# Patient Record
Sex: FEMALE | Race: WHITE | HISPANIC OR LATINO | URBAN - METROPOLITAN AREA
[De-identification: names, ages, dates, MRNs, and addresses within clinical notes are randomized per-mention and may not be internally consistent; named-entity substitution may affect disease eponyms.]

---
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
## 2018-04-14 ENCOUNTER — EMERGENCY (EMERGENCY)
Facility: HOSPITAL | Age: 18
LOS: 0 days | Discharge: HOME | End: 2018-04-15
Attending: EMERGENCY MEDICINE

## 2018-04-14 VITALS
RESPIRATION RATE: 18 BRPM | SYSTOLIC BLOOD PRESSURE: 113 MMHG | DIASTOLIC BLOOD PRESSURE: 65 MMHG | HEART RATE: 91 BPM | OXYGEN SATURATION: 97 % | TEMPERATURE: 101 F

## 2018-04-14 DIAGNOSIS — R50.9 FEVER, UNSPECIFIED: ICD-10-CM

## 2018-04-14 DIAGNOSIS — B34.9 VIRAL INFECTION, UNSPECIFIED: ICD-10-CM

## 2018-04-14 NOTE — ED PEDIATRIC TRIAGE NOTE - CHIEF COMPLAINT QUOTE
For the last three days I feel week, my bones hurt, headache, I feel hot inside, dry cough - patinet

## 2018-04-15 VITALS
HEART RATE: 81 BPM | OXYGEN SATURATION: 99 % | SYSTOLIC BLOOD PRESSURE: 116 MMHG | DIASTOLIC BLOOD PRESSURE: 67 MMHG | TEMPERATURE: 100 F | RESPIRATION RATE: 16 BRPM

## 2018-04-15 RX ORDER — IBUPROFEN 200 MG
600 TABLET ORAL ONCE
Qty: 0 | Refills: 0 | Status: COMPLETED | OUTPATIENT
Start: 2018-04-15 | End: 2018-04-15

## 2018-04-15 RX ADMIN — Medication 600 MILLIGRAM(S): at 00:45

## 2018-04-15 NOTE — ED PEDIATRIC NURSE NOTE - OBJECTIVE STATEMENT
Pt w/ multiple complaints. C/O non-productive cough, nasal congestion, sore throat, body aches. (+) vomiting x 1 episode and nosebleed yesterday. Pt took Extra Strength Tylenol at 5 pm. No other s/sx reported, no other home remedy taken. Hx anemia.

## 2018-04-15 NOTE — ED PROVIDER NOTE - PHYSICAL EXAMINATION
Physical Examination:   VITAL SIGNS: I have reviewed vital signs.  CONSTITUTIONAL: well appearing, NAD.   SKIN: Skin exam is warm and dry.  No rashes  HEAD: Normocephalic; atraumatic.  EYES: PERRL, EOM intact; conjunctiva and sclera clear.  ENT: mild nasal discharge; airway clear. posterior pharynx normal.  TMs clear b/l.   NECK: no nuchal rigidity  CARD: S1, S2 reg  RESP: CTAB. No wheezes, rales or rhonchi.  ABD: soft; non-distended; non-tender  EXT: Normal ROM. No edema.  NEURO: Alert, oriented. Grossly unremarkable. No focal deficits. Ambulatory with steady gait.  PSYCH: Cooperative, appropriate.

## 2018-04-15 NOTE — ED PROVIDER NOTE - NS ED ROS FT
Review of Systems:  · CONSTITUTIONAL: + fever and chills.  · EYES: no discharge, no irritation, no pain, no redness, and no visual changes.  · ENMT: Ears: no ear pain and no hearing problems. Nose: + nasal congestion. Mouth/Throat: no dysphagia, no hoarseness and + throat pain.  · CARDIOVASCULAR: no chest pain  · RESPIRATORY: + cough, and no shortness of breath.  · GASTROINTESTINAL: no abdominal pain, no diarrhea, no nausea and no vomiting.  · GENITOURINARY: no dysuria  · MUSCULOSKELETAL: no back pain, + musculoskeletal pain, no weakness.  · SKIN: no rashes.  · NEURO: no loss of consciousness, + headache.  · ROS STATEMENT: all other ROS negative except as per HPI

## 2018-04-15 NOTE — ED PROVIDER NOTE - OBJECTIVE STATEMENT
16 y/o F here with fever, myalgias, headache and nasaal congestion.  1 bout of emesis yestereday.  Able to tolerate PO today.  No dysuria.  Mild cough. no diarrhea.  Tried tylenol but not motrin.  No PMH.

## 2018-07-24 PROBLEM — Z78.9 ALCOHOL USE: Status: ACTIVE | Noted: 2017-04-25

## 2021-03-17 ENCOUNTER — OUTPATIENT (OUTPATIENT)
Dept: OUTPATIENT SERVICES | Facility: HOSPITAL | Age: 21
LOS: 1 days | Discharge: HOME | End: 2021-03-17

## 2021-03-17 DIAGNOSIS — K02.63 DENTAL CARIES ON SMOOTH SURFACE PENETRATING INTO PULP: ICD-10-CM

## 2021-03-17 PROBLEM — D64.9 ANEMIA, UNSPECIFIED: Chronic | Status: ACTIVE | Noted: 2018-04-15

## 2021-04-20 ENCOUNTER — EMERGENCY (EMERGENCY)
Facility: HOSPITAL | Age: 21
LOS: 0 days | Discharge: HOME | End: 2021-04-20
Attending: STUDENT IN AN ORGANIZED HEALTH CARE EDUCATION/TRAINING PROGRAM | Admitting: STUDENT IN AN ORGANIZED HEALTH CARE EDUCATION/TRAINING PROGRAM
Payer: COMMERCIAL

## 2021-04-20 VITALS
TEMPERATURE: 98 F | OXYGEN SATURATION: 97 % | SYSTOLIC BLOOD PRESSURE: 100 MMHG | DIASTOLIC BLOOD PRESSURE: 61 MMHG | HEART RATE: 71 BPM | RESPIRATION RATE: 18 BRPM

## 2021-04-20 DIAGNOSIS — Z86.2 PERSONAL HISTORY OF DISEASES OF THE BLOOD AND BLOOD-FORMING ORGANS AND CERTAIN DISORDERS INVOLVING THE IMMUNE MECHANISM: ICD-10-CM

## 2021-04-20 DIAGNOSIS — S50.312A ABRASION OF LEFT ELBOW, INITIAL ENCOUNTER: ICD-10-CM
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
## 2021-04-20 DIAGNOSIS — M54.2 CERVICALGIA: ICD-10-CM
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
## 2021-04-20 DIAGNOSIS — M25.522 PAIN IN LEFT ELBOW: ICD-10-CM

## 2021-04-20 PROCEDURE — 73080 X-RAY EXAM OF ELBOW: CPT | Mod: 26,LT

## 2021-04-20 PROCEDURE — 71045 X-RAY EXAM CHEST 1 VIEW: CPT | Mod: 26

## 2021-04-20 PROCEDURE — 99284 EMERGENCY DEPT VISIT MOD MDM: CPT

## 2021-04-20 RX ORDER — IBUPROFEN 200 MG
400 TABLET ORAL ONCE
Refills: 0 | Status: COMPLETED | OUTPATIENT
Start: 2021-04-20 | End: 2021-04-20

## 2021-04-20 RX ORDER — METHOCARBAMOL 500 MG/1
1500 TABLET, FILM COATED ORAL ONCE
Refills: 0 | Status: COMPLETED | OUTPATIENT
Start: 2021-04-20 | End: 2021-04-20

## 2021-04-20 RX ADMIN — METHOCARBAMOL 1500 MILLIGRAM(S): 500 TABLET, FILM COATED ORAL at 10:29

## 2021-04-20 RX ADMIN — Medication 400 MILLIGRAM(S): at 10:28

## 2021-04-20 NOTE — ED PROVIDER NOTE - CLINICAL SUMMARY MEDICAL DECISION MAKING FREE TEXT BOX
no fx on imaging, pt well appearing. will dc and rec outpt f/u, nsaids, return precautions. no loc/n/v/ac or anti plt/change in mental status. no indication for CTH.

## 2021-04-20 NOTE — ED PROVIDER NOTE - PHYSICAL EXAMINATION
CONSTITUTIONAL: Well-developed; well-nourished; in no acute distress.   SKIN: warm, dry  HEAD: Normocephalic; atraumatic.  EYES: PERRL, EOMI, normal sclera and conjunctiva   ENT: No nasal discharge; airway clear.  NECK: Supple; non tender.  CARD: S1, S2 normal; no murmurs, gallops, or rubs. Regular rate and rhythm.   RESP: No wheezes, rales or rhonchi.  ABD: soft ntnd  MSK: TTP paraspinal neck, no midline tenderness  EXT: Normal ROM.  No clubbing, cyanosis or edema.   LYMPH: No acute cervical adenopathy.  NEURO: Alert, oriented, grossly unremarkable  PSYCH: Cooperative, appropriate.
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
## 2021-04-20 NOTE — ED PROVIDER NOTE - NSFOLLOWUPCLINICS_GEN_ALL_ED_FT
[facility]  Medicine  [address], [place]   Phone: [phone]  Fax:   Follow Up Time: 1-3 Days

## 2021-04-20 NOTE — ED PROVIDER NOTE - PATIENT PORTAL LINK FT
You can access the FollowMyHealth Patient Portal offered by [facility] by registering at the following website: [email or web address]. By joining [employer]’s FollowMyHealth portal, you will also be able to view your health information using other applications (apps) compatible with our system.

## 2021-04-20 NOTE — ED PROVIDER NOTE - NS ED ROS FT
Constitutional:  see HPI  Head:  no headache, dizziness, loss of consciousness  Eyes:  no visual changes; no eye pain, redness, or discharge  ENMT:  no ear pain or discharge; no hearing problems; no mouth or throat sores or lesions; no throat pain  Cardiac: no chest pain, tachycardia or palpitations  Respiratory: no cough, wheezing, shortness of breath, chest tightness, or trouble breathing  GI: no nausea, vomiting, diarrhea or abdominal pain  :  no dysuria, frequency, or burning with urination; no change in urine output  MS: paraspinal neck pain,   Neuro: no weakness; no numbness or tingling; no seizure  Skin:  no rashes or color changes; no lacerations or abrasions
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
## 2021-05-26 ENCOUNTER — EMERGENCY (EMERGENCY)
Facility: HOSPITAL | Age: 21
LOS: 0 days | Discharge: HOME | End: 2021-05-26
Attending: STUDENT IN AN ORGANIZED HEALTH CARE EDUCATION/TRAINING PROGRAM | Admitting: STUDENT IN AN ORGANIZED HEALTH CARE EDUCATION/TRAINING PROGRAM
Payer: MEDICAID

## 2021-05-26 VITALS
WEIGHT: 154.76 LBS | OXYGEN SATURATION: 99 % | SYSTOLIC BLOOD PRESSURE: 105 MMHG | HEART RATE: 71 BPM | TEMPERATURE: 98 F | RESPIRATION RATE: 18 BRPM | DIASTOLIC BLOOD PRESSURE: 56 MMHG

## 2021-05-26 VITALS
SYSTOLIC BLOOD PRESSURE: 121 MMHG | RESPIRATION RATE: 18 BRPM | TEMPERATURE: 98 F | HEART RATE: 77 BPM | DIASTOLIC BLOOD PRESSURE: 62 MMHG | OXYGEN SATURATION: 99 %

## 2021-05-26 DIAGNOSIS — O20.9 HEMORRHAGE IN EARLY PREGNANCY, UNSPECIFIED: ICD-10-CM

## 2021-05-26 DIAGNOSIS — N93.9 ABNORMAL UTERINE AND VAGINAL BLEEDING, UNSPECIFIED: ICD-10-CM

## 2021-05-26 DIAGNOSIS — Z3A.01 LESS THAN 8 WEEKS GESTATION OF PREGNANCY: ICD-10-CM

## 2021-05-26 DIAGNOSIS — O20.0 THREATENED ABORTION: ICD-10-CM

## 2021-05-26 LAB
ABO RH CONFIRMATION: SIGNIFICANT CHANGE UP
ALBUMIN SERPL ELPH-MCNC: 4.5 G/DL — SIGNIFICANT CHANGE UP (ref 3.5–5.2)
ALP SERPL-CCNC: 82 U/L — SIGNIFICANT CHANGE UP (ref 30–115)
ALT FLD-CCNC: 11 U/L — SIGNIFICANT CHANGE UP (ref 0–41)
ANION GAP SERPL CALC-SCNC: 9 MMOL/L — SIGNIFICANT CHANGE UP (ref 7–14)
APPEARANCE UR: ABNORMAL
AST SERPL-CCNC: 15 U/L — SIGNIFICANT CHANGE UP (ref 0–41)
BACTERIA # UR AUTO: NEGATIVE — SIGNIFICANT CHANGE UP
BASOPHILS # BLD AUTO: 0.02 K/UL — SIGNIFICANT CHANGE UP (ref 0–0.2)
BASOPHILS NFR BLD AUTO: 0.3 % — SIGNIFICANT CHANGE UP (ref 0–1)
BILIRUB SERPL-MCNC: <0.2 MG/DL — SIGNIFICANT CHANGE UP (ref 0.2–1.2)
BILIRUB UR-MCNC: NEGATIVE — SIGNIFICANT CHANGE UP
BLD GP AB SCN SERPL QL: SIGNIFICANT CHANGE UP
BUN SERPL-MCNC: 10 MG/DL — SIGNIFICANT CHANGE UP (ref 10–20)
CALCIUM SERPL-MCNC: 8.8 MG/DL — SIGNIFICANT CHANGE UP (ref 8.5–10.1)
CHLORIDE SERPL-SCNC: 104 MMOL/L — SIGNIFICANT CHANGE UP (ref 98–110)
CO2 SERPL-SCNC: 24 MMOL/L — SIGNIFICANT CHANGE UP (ref 17–32)
COLOR SPEC: ABNORMAL
CREAT SERPL-MCNC: 0.6 MG/DL — LOW (ref 0.7–1.5)
DIFF PNL FLD: ABNORMAL
EOSINOPHIL # BLD AUTO: 0.06 K/UL — SIGNIFICANT CHANGE UP (ref 0–0.7)
EOSINOPHIL NFR BLD AUTO: 0.9 % — SIGNIFICANT CHANGE UP (ref 0–8)
EPI CELLS # UR: 4 /HPF — SIGNIFICANT CHANGE UP (ref 0–5)
GLUCOSE SERPL-MCNC: 101 MG/DL — HIGH (ref 70–99)
GLUCOSE UR QL: NEGATIVE — SIGNIFICANT CHANGE UP
HCG SERPL-ACNC: 898.9 MIU/ML — HIGH
HCT VFR BLD CALC: 36.5 % — LOW (ref 37–47)
HGB BLD-MCNC: 12 G/DL — SIGNIFICANT CHANGE UP (ref 12–16)
HYALINE CASTS # UR AUTO: 29 /LPF — HIGH (ref 0–7)
IMM GRANULOCYTES NFR BLD AUTO: 0.4 % — HIGH (ref 0.1–0.3)
KETONES UR-MCNC: SIGNIFICANT CHANGE UP
LEUKOCYTE ESTERASE UR-ACNC: NEGATIVE — SIGNIFICANT CHANGE UP
LYMPHOCYTES # BLD AUTO: 2.14 K/UL — SIGNIFICANT CHANGE UP (ref 1.2–3.4)
LYMPHOCYTES # BLD AUTO: 30.5 % — SIGNIFICANT CHANGE UP (ref 20.5–51.1)
MCHC RBC-ENTMCNC: 27.6 PG — SIGNIFICANT CHANGE UP (ref 27–31)
MCHC RBC-ENTMCNC: 32.9 G/DL — SIGNIFICANT CHANGE UP (ref 32–37)
MCV RBC AUTO: 84.1 FL — SIGNIFICANT CHANGE UP (ref 81–99)
MONOCYTES # BLD AUTO: 0.46 K/UL — SIGNIFICANT CHANGE UP (ref 0.1–0.6)
MONOCYTES NFR BLD AUTO: 6.6 % — SIGNIFICANT CHANGE UP (ref 1.7–9.3)
NEUTROPHILS # BLD AUTO: 4.3 K/UL — SIGNIFICANT CHANGE UP (ref 1.4–6.5)
NEUTROPHILS NFR BLD AUTO: 61.3 % — SIGNIFICANT CHANGE UP (ref 42.2–75.2)
NITRITE UR-MCNC: NEGATIVE — SIGNIFICANT CHANGE UP
NRBC # BLD: 0 /100 WBCS — SIGNIFICANT CHANGE UP (ref 0–0)
PH UR: 6 — SIGNIFICANT CHANGE UP (ref 5–8)
PLATELET # BLD AUTO: 393 K/UL — SIGNIFICANT CHANGE UP (ref 130–400)
POTASSIUM SERPL-MCNC: 4.1 MMOL/L — SIGNIFICANT CHANGE UP (ref 3.5–5)
POTASSIUM SERPL-SCNC: 4.1 MMOL/L — SIGNIFICANT CHANGE UP (ref 3.5–5)
PROT SERPL-MCNC: 7 G/DL — SIGNIFICANT CHANGE UP (ref 6–8)
PROT UR-MCNC: ABNORMAL
RBC # BLD: 4.34 M/UL — SIGNIFICANT CHANGE UP (ref 4.2–5.4)
RBC # FLD: 13.7 % — SIGNIFICANT CHANGE UP (ref 11.5–14.5)
RBC CASTS # UR COMP ASSIST: 7 /HPF — HIGH (ref 0–4)
SODIUM SERPL-SCNC: 137 MMOL/L — SIGNIFICANT CHANGE UP (ref 135–146)
SP GR SPEC: 1.04 — HIGH (ref 1.01–1.03)
UROBILINOGEN FLD QL: SIGNIFICANT CHANGE UP
WBC # BLD: 7.01 K/UL — SIGNIFICANT CHANGE UP (ref 4.8–10.8)
WBC # FLD AUTO: 7.01 K/UL — SIGNIFICANT CHANGE UP (ref 4.8–10.8)
WBC UR QL: 12 /HPF — HIGH (ref 0–5)

## 2021-05-26 PROCEDURE — 99285 EMERGENCY DEPT VISIT HI MDM: CPT

## 2021-05-26 PROCEDURE — 76817 TRANSVAGINAL US OBSTETRIC: CPT | Mod: 26

## 2021-05-26 PROCEDURE — 76830 TRANSVAGINAL US NON-OB: CPT | Mod: 26

## 2021-05-26 PROCEDURE — 76815 OB US LIMITED FETUS(S): CPT | Mod: 26

## 2021-05-26 RX ORDER — ACETAMINOPHEN 500 MG
650 TABLET ORAL ONCE
Refills: 0 | Status: COMPLETED | OUTPATIENT
Start: 2021-05-26 | End: 2021-05-26

## 2021-05-26 RX ADMIN — Medication 650 MILLIGRAM(S): at 16:03

## 2021-05-26 NOTE — ED ADULT NURSE NOTE - NSIMPLEMENTINTERV_GEN_ALL_ED
Implemented All Universal Safety Interventions:  [employer] to call system. Call bell, personal items and telephone within reach. Instruct patient to call for assistance. Room bathroom lighting operational. Non-slip footwear when patient is off stretcher. Physically safe environment: no spills, clutter or unnecessary equipment. Stretcher in lowest position, wheels locked, appropriate side rails in place.

## 2021-05-26 NOTE — ED ADULT NURSE NOTE - PAIN: PRESENCE, MLM
Problem: Adult Inpatient Plan of Care  Goal: Plan of Care Review  Outcome: Ongoing (interventions implemented as appropriate)  Plan of care reviewed with the patient at the beginning of the shift. She is day +3 of an auto transplant. She has complaints of intermittent nausea that is well controlled with PO Zofran. PO intake encouraged as tolerated. IVF infusing. She denies diarrhea. Last BM yesterday. Mucous membranes are intact. Saliva substitutes and salt and soda rinses provided. Blood glucose monitored ac and hs, did not require coverage overnight. Pt has left knee swelling and discomfort from a previous fall at home. Fall precautions maintained. Bed alarm on, pt does not use call light. Alarm has gone off three times. Reinforced several times to use the call light. Bedside commode in use. Fall precautions reviewed with her several times. Pt remained free from falls and injury this shift. Bed locked in lowest position, side rails up x2, call light within reach. Instructed pt to call for assistance as needed. Pt verbalized understanding. Vitals stable. Pt afebrile overnight. Neutropenic precautions maintained. No acute issues overnight. Will continue to monitor.        denies pain/discomfort

## 2021-05-26 NOTE — ED PROVIDER NOTE - NSFOLLOWUPCLINICS_GEN_ALL_ED_FT
[facility]  OB/GYN  [address] [place] [address]  Phone: [phone]  Fax:   Follow Up Time: Urgent

## 2021-05-26 NOTE — ED PROVIDER NOTE - OBJECTIVE STATEMENT
20 year old female, , currently ~5 weeks pregnant, who presents with vaginal bleeding.  patient reports LMP 4/15, +upreg x1 week ago, patient reports vaginal spotting that began this morning progrsesively worsening throughout the day prompting presentation to ed. patient reports suprapubic discomfort. denies f/c, nausea/vomiting/diarrhea, urinary symptoms.

## 2021-05-26 NOTE — ED PROVIDER NOTE - PATIENT PORTAL LINK FT
You can access the FollowMyHealth Patient Portal offered by [facility] by registering at the following website: [email or web address]. By joining [employer]’s FollowMyHealth portal, you will also be able to view your health information using other applications (apps) compatible with our system.

## 2021-05-26 NOTE — ED PROVIDER NOTE - ATTENDING CONTRIBUTION TO CARE
20F  at 5w5d by LMP (4/15) who p/w painless vaginal bleeding that started this morning. At first only small brb but became larger amount; No clots. Denies abd pain, n/v/d/f/c, urinary sx, cp sob. Her obgyn is at [facility] but she needs an obgyn close by.     Gen - NAD, Head - NCAT, Pharynx - clear, MMM, Heart - RRR, no m/g/r, Lungs - CTAB, no w/c/r, Abdomen - soft, NT, ND, Skin - No rash, Extremities - FROM, no edema, erythema, ecchymosis, brisk cap refill, Neuro - A&O x3, equal strength and sensation, non-focal exam    a/p: will eval for threatened , IUP, ro ectopic, and obtain labs, t&s, tvus, and will reassess. 20F  at 5w5d by LMP (4/15) who p/w painless vaginal bleeding that started this morning. At first only small brb but became larger amount later today; No clots. Denies abd pain, n/v/d/f/c, urinary sx, cp sob. Her obgyn is at [facility] but she needs an obgyn close by.     Gen - NAD, Head - NCAT, Pharynx - clear, MMM, Heart - RRR, no m/g/r, Lungs - CTAB, no w/c/r, Abdomen - soft, NT, ND, Skin - No rash, Extremities - FROM, no edema, erythema, ecchymosis, brisk cap refill, Neuro - A&O x3, equal strength and sensation, non-focal exam    a/p: will eval for threatened , IUP, ro ectopic, and obtain labs, t&s, tvus, and will reassess.

## 2021-05-26 NOTE — ED ADULT NURSE NOTE - CAS TRG GEN SKIN CONDITION
Central Venous Line w/wo PAC  Performed by: Abdi Torre MD  Authorized by: Abdi Torre MD     Patient Location*:  OR  Indication: central venous access and CVP monitoring    Prep*:  Chlorhexidine gluconate w/ alcohol  Max Sterile Barrier Technique*:  Hand Washing, Cap/Mask, Sterile gown, Sterile gloves, Sterile full body drape, Biopatch, Sterile gel & probe cover and Sterile dressing applied  Patient Position:  Trendelenburg  Laterality:  Left  Site:  Internal jugular  Catheter Type:  Introducer  Number of Lumens*:  Double lumen  Catheter Size:  Other (12F)  Oximetric Catheter?: No    target vein identified, needle advanced into vein and blood aspirated and guidewire advanced into vein    Ultrasound-Guided: Yes    Sterile Gel and Probe Cover Used for Ultrasound?: Yes    Seldinger Technique: Yes    Intravenous Verification: verified by ultrasound and venous blood return    .: all ports aspirated, all ports flushed easily, guidewire was removed intact, biopatch was applied, line was sutured in place and dressing was applied    Events: patient tolerated procedure well with no complications    Attempts:  2  PA Catheter Placed?: Yes    PA Catheter Type:  Oximetric  PA Catheter Size:  7.5  Laterality:  Left  Site:  Internal jugular  Placement Confirmation: pressure tracing changes and verified by [facility]    Events:  Patient tolerated procedure well with no complications  Performed By:  Anesthesiologist  Anesthesiologist:  Abdi Torre MD   Right internal jugular attempted initially. Small caliber vein successfully accessed by needle but unable to pass wire. Moved to left internal jugular with successful placement.         Warm

## 2021-05-26 NOTE — ED PROVIDER NOTE - PHYSICAL EXAMINATION
CONSTITUTIONAL: Well-developed; well-nourished; in no acute distress, nontoxic appearing  SKIN: skin exam is warm and dry,  HEAD: Normocephalic; atraumatic.  CARD: S1, S2 normal, no murmur  RESP: No wheezes, rales or rhonchi. Good air movement bilaterally  ABD: soft; non-distended; non-tender. No Rebound, No guarding  : Speculum exam: +bleeding with small clots, no pooling. Bimanual exam: No CMT. No adnexal TTP. Chaperone: PRABHJOT Shearer  EXT: Normal ROM.   NEURO: awake, alert, following commands, oriented, grossly unremarkable. No Focal deficits. GCS 15.   PSYCH: Cooperative, appropriate.

## 2021-05-26 NOTE — ED PROVIDER NOTE - NSFOLLOWUPINSTRUCTIONS_ED_ALL_ED_FT
Please follow up with OB/GYN clinic in 1-3 days   Please be aware of any new or worsening signs or symptoms that should prompt your return to the ER.    Vaginal Bleeding in Pregnancy: Threatened     A threatened miscarriage occurs when you have vaginal bleeding during your first 20 weeks of pregnancy but the pregnancy has not ended. If you have vaginal bleeding during this time, your health care provider will do tests to make sure you are still pregnant. If the tests show you are still pregnant and the developing baby (fetus) inside your womb (uterus) is still growing, your condition is considered a threatened miscarriage. A threatened miscarriage does not mean your pregnancy will end, but it does increase the risk of losing your pregnancy.    SEEK IMMEDIATE MEDICAL CARE IF YOU HAVE THE FOLLOWING SYMPTOMS: heavy vaginal bleeding, severe low back or abdominal cramps, fever/chills, or lightheadedness/dizziness.      Incomplete Miscarriage      A miscarriage is the loss of an unborn baby (fetus) before the 20th week of pregnancy. In an incomplete miscarriage, parts of the fetus or placenta (afterbirth) remain in the body. Most miscarriages happen in the first 3 months of pregnancy. Sometimes, it happens before a woman even knows she is pregnant.    Having a miscarriage can be an emotional experience. If you have had a miscarriage, talk with your health care provider about any questions you may have about miscarrying, the grieving process, and your future pregnancy plans.      What are the causes?  This condition may be caused by:  •Problems with the genes or chromosomes that make it impossible for the baby to develop normally. These problems are most often the result of random errors that occur early in development, and are not passed from parent to child (not inherited).      •Infection of the cervix or uterus.      •Conditions that affect hormone balance in the body.      •Problems with the cervix, such as the cervix opening and thinning before pregnancy is at term (cervical insufficiency).      •Problems with the uterus, such as a uterus with an abnormal shape, fibroids in the uterus, or problems that were present from birth (congenital abnormalities).      •Certain medical conditions.      •Smoking, drinking alcohol, or using drugs.      •Injury (trauma).      Many times, the cause of a miscarriage is not known.      What are the signs or symptoms?  Symptoms of this condition include:  •Vaginal bleeding or spotting, with or without cramps or pain.      •Pain or cramping in the abdomen or lower back.      •Passing fluid, tissue, or blood clots from the vagina.        How is this diagnosed?  This condition may be diagnosed based on:  •A physical exam.      •Ultrasound.      •Blood tests.      •Urine tests.        How is this treated?  An incomplete miscarriage may be treated with:  •Dilation and curettage (D&C). This is a procedure in which the cervix is stretched open and the lining of the uterus (endometrium) is scraped to remove any remaining tissue from the pregnancy.    •Medicines, such as:  •Antibiotic medicine to treat infection.      •Medicine to help any remaining tissue pass out of your uterus.      •Medicine to reduce (contract) the size of the uterus. These medicines may be given if you have a lot of bleeding.        If you have Rh negative blood and your baby was Rh positive, you will need a shot of medicine called Rh immunoglobulinto protect future babies from Rh blood problems. "Rh-negative" and "Rh-positive" refer to whether or not the blood has a specific protein found on the surface of red blood cells (Rh factor).      Follow these instructions at home:      Medicines      •Take over-the-counter and prescription medicines only as told by your health care provider.      •If you were prescribed antibiotic medicine, take your antibiotic as told by your health care provider. Do not stop taking the antibiotic even if you start to feel better.      • Do not take NSAIDs, such as aspirin and ibuprofen, unless approved by your doctor. These medicines can cause bleeding.      Activity     •Rest as directed. Ask your health care provider what activities are safe for you.      •Have someone help with home and family responsibilities during this time.      General instructions     •Keep track of the number of sanitary pads you use each day and how soaked (saturated) they are. Write down this information.      •Monitor the amount of tissue or blood clots that you pass from your vagina. Save any large amounts of tissue for your health care provider to examine.      • Do not use tampons, douche, or have sex until your health care provider approves.      •To help you and your partner with the process of grieving, talk with your health care provider or seek counseling to help cope with the pregnancy loss.      •When you are ready, meet with your health care provider to discuss important steps you should take for your health, as well as steps to take in order to have a healthy pregnancy in the future.      •Keep all follow-up visits as told by your health care provider. This is important.        Where to find more information    •The American Congress of Obstetricians and Gynecologists: www.acog.org      •U.[place]. Department of Health and Human Services Office of Women’s Health: www.womenshealth.gov        Contact a health care provider if:    •You have a fever or chills.      •You have a foul smelling vaginal discharge.        Get help right away if:    •You have severe cramps or pain in your back or abdomen.      •You pass walnut-sized (or larger) blood clots or tissue from your vagina.      •You have heavy bleeding, soaking more than 1 regular sanitary pad in an hour.      •You become lightheaded or weak.      •You pass out.      •You have feelings of sadness that take over your thoughts, or you have thoughts of hurting yourself.        Summary    •In an incomplete miscarriage, parts of the fetus or placenta (afterbirth) remain in the body.      •There are multiple treatment options for an incomplete miscarriage, talk to your health care provider about the best option for you.      •Follow your health care provider's instructions for follow-up care.      •To help you and your partner with the process of grieving, talk with your health care provider or seek counseling to help cope with the pregnancy loss.      This information is not intended to replace advice given to you by your health care provider. Make sure you discuss any questions you have with your health care provider.      Document Revised: 2019 Document Reviewed: 2018    ElseGloss[employer] Patient Education ©  Elsevier Inc.

## 2021-05-26 NOTE — ED PROVIDER NOTE - NS ED ROS FT
Review of Systems:  	•	CONSTITUTIONAL: no fever, no diaphoresis, no chills  	•	SKIN: no rash  	•	RESPIRATORY: no shortness of breath, no cough  	•	CARDIAC: no chest pain, no palpitations  	•	GI: +abd pain, no nausea/vomiting/diarrhea/constipation  	•	GENITO-URINARY:+vaginal bleeding. no urinary symptoms  	•	MUSCULOSKELETAL: no joint paint, no swelling, no redness  	•	NEUROLOGIC: no weakness, no headache  	•	PSYCH: no anxiety, non suicidal, non homicidal, no hallucination, no depression

## 2021-05-26 NOTE — ED PROVIDER NOTE - CARE PLAN
Principal Discharge DX:	Vaginal bleeding affecting early pregnancy   Principal Discharge DX:	Vaginal bleeding affecting early pregnancy  Secondary Diagnosis:	Threatened

## 2021-05-26 NOTE — ED PROVIDER NOTE - PROGRESS NOTE DETAILS
COLLADO: patient likely w/ spontaneous . VSS. patient's pain controlled. patient given rhogam. given strict return precautions, verbalizes understanding of plan. patient to fu with ob/gyn this week.

## 2021-05-26 NOTE — ED ADULT NURSE NOTE - OBJECTIVE STATEMENT
Pt c/o vaginal bleeding, pt states she found out 1 week ago she was pregnant, unsure of how far along, pt has hx of 2 miscarriages. Denies abdominal pain/cramping.

## 2021-05-26 NOTE — ED ADULT TRIAGE NOTE - CHIEF COMPLAINT QUOTE
pt states she found out she was pregnant 1 week ago and is having bleeding starting since this morning. pt has hx of 2 miscarriages + 1 normal delivery.  pt states bleeding started as spotting but is heavier at this time.

## 2021-05-26 NOTE — ED PROVIDER NOTE - CLINICAL SUMMARY MEDICAL DECISION MAKING FREE TEXT BOX
20F  at 5w5d by LMP (4/15) who p/w painless vaginal bleeding that started this morning. Labs and imaging reviewed. hcg 898, US shows 0.5cm anechoic cystic structure in uterine segment no definitive IUP, 1.8cm L hemorrhagic cyst. Discussed with patient cannot r/o ectopic pregnancy vs early IUP vs threatened . Pt rh neg and given rhogam. On reassessment pt well appearing, walking around ED, She was given strict return precautions incl worsening bleeding or intractible pain and f/u with obgyn. I have fully discussed the medical management and delivery of care with the patient. I have discussed any available labs, imaging and treatment options with the patient. All Questions answered at the bedside and printed copies of all results provided and recommended to review with PCP. Patient confirms understanding and has been given detailed return precautions. Patient instructed to return to the ED should symptoms persist or worsen. Patient has demonstrated capacity and has verbalized understanding. Patient is well appearing upon discharge, ambulatory with a steady gait.

## 2021-05-27 LAB
CULTURE RESULTS: NO GROWTH — SIGNIFICANT CHANGE UP
SPECIMEN SOURCE: SIGNIFICANT CHANGE UP

## 2021-05-29 NOTE — ED ADULT NURSE NOTE - PAIN: PRESENCE, MLM
Patient Returning Call  Reason for call:  Patient  Information relayed to patient:  Patient was informed he was due for a colon cancer screening.  Patient stated he will  a stool card from the clinic.  Patient has additional questions:  No  If YES, what are your questions/concerns:  N/a  Okay to leave a detailed message?: No call back needed   complains of pain/discomfort

## 2021-09-15 ENCOUNTER — OUTPATIENT (OUTPATIENT)
Dept: OUTPATIENT SERVICES | Facility: HOSPITAL | Age: 21
LOS: 1 days | Discharge: HOME | End: 2021-09-15

## 2021-09-15 DIAGNOSIS — Z01.21 ENCOUNTER FOR DENTAL EXAMINATION AND CLEANING WITH ABNORMAL FINDINGS: ICD-10-CM

## 2023-03-22 ENCOUNTER — OUTPATIENT (OUTPATIENT)
Dept: INPATIENT UNIT | Facility: HOSPITAL | Age: 23
LOS: 1 days | Discharge: HOME | End: 2023-03-22
Payer: MEDICAID

## 2023-03-22 VITALS
DIASTOLIC BLOOD PRESSURE: 64 MMHG | HEART RATE: 63 BPM | SYSTOLIC BLOOD PRESSURE: 100 MMHG | TEMPERATURE: 98 F | RESPIRATION RATE: 18 BRPM

## 2023-03-22 VITALS — HEART RATE: 63 BPM | DIASTOLIC BLOOD PRESSURE: 64 MMHG | SYSTOLIC BLOOD PRESSURE: 100 MMHG

## 2023-03-22 DIAGNOSIS — R10.0 ACUTE ABDOMEN: ICD-10-CM

## 2023-03-22 DIAGNOSIS — O26.899 OTHER SPECIFIED PREGNANCY RELATED CONDITIONS, UNSPECIFIED TRIMESTER: ICD-10-CM

## 2023-03-22 LAB
BASOPHILS # BLD AUTO: 0.02 K/UL — SIGNIFICANT CHANGE UP (ref 0–0.2)
BASOPHILS NFR BLD AUTO: 0.2 % — SIGNIFICANT CHANGE UP (ref 0–1)
BLD GP AB SCN SERPL QL: SIGNIFICANT CHANGE UP
EOSINOPHIL # BLD AUTO: 0.03 K/UL — SIGNIFICANT CHANGE UP (ref 0–0.7)
EOSINOPHIL NFR BLD AUTO: 0.3 % — SIGNIFICANT CHANGE UP (ref 0–8)
HCT VFR BLD CALC: 30.2 % — LOW (ref 37–47)
HGB BLD-MCNC: 10.2 G/DL — LOW (ref 12–16)
HIV 1 & 2 AB SERPL IA.RAPID: SIGNIFICANT CHANGE UP
IMM GRANULOCYTES NFR BLD AUTO: 0.6 % — HIGH (ref 0.1–0.3)
LYMPHOCYTES # BLD AUTO: 1.73 K/UL — SIGNIFICANT CHANGE UP (ref 1.2–3.4)
LYMPHOCYTES # BLD AUTO: 18.7 % — LOW (ref 20.5–51.1)
MCHC RBC-ENTMCNC: 29.4 PG — SIGNIFICANT CHANGE UP (ref 27–31)
MCHC RBC-ENTMCNC: 33.8 G/DL — SIGNIFICANT CHANGE UP (ref 32–37)
MCV RBC AUTO: 87 FL — SIGNIFICANT CHANGE UP (ref 81–99)
MONOCYTES # BLD AUTO: 0.6 K/UL — SIGNIFICANT CHANGE UP (ref 0.1–0.6)
MONOCYTES NFR BLD AUTO: 6.5 % — SIGNIFICANT CHANGE UP (ref 1.7–9.3)
NEUTROPHILS # BLD AUTO: 6.81 K/UL — HIGH (ref 1.4–6.5)
NEUTROPHILS NFR BLD AUTO: 73.7 % — SIGNIFICANT CHANGE UP (ref 42.2–75.2)
NRBC # BLD: 0 /100 WBCS — SIGNIFICANT CHANGE UP (ref 0–0)
PLATELET # BLD AUTO: 359 K/UL — SIGNIFICANT CHANGE UP (ref 130–400)
PRENATAL SYPHILIS TEST: SIGNIFICANT CHANGE UP
RBC # BLD: 3.47 M/UL — LOW (ref 4.2–5.4)
RBC # FLD: 13.2 % — SIGNIFICANT CHANGE UP (ref 11.5–14.5)
WBC # BLD: 9.25 K/UL — SIGNIFICANT CHANGE UP (ref 4.8–10.8)
WBC # FLD AUTO: 9.25 K/UL — SIGNIFICANT CHANGE UP (ref 4.8–10.8)

## 2023-03-22 PROCEDURE — 86901 BLOOD TYPING SEROLOGIC RH(D): CPT

## 2023-03-22 PROCEDURE — 85025 COMPLETE CBC W/AUTO DIFF WBC: CPT

## 2023-03-22 PROCEDURE — 86765 RUBEOLA ANTIBODY: CPT

## 2023-03-22 PROCEDURE — 36415 COLL VENOUS BLD VENIPUNCTURE: CPT

## 2023-03-22 PROCEDURE — 86592 SYPHILIS TEST NON-TREP QUAL: CPT

## 2023-03-22 PROCEDURE — 87340 HEPATITIS B SURFACE AG IA: CPT

## 2023-03-22 PROCEDURE — 86762 RUBELLA ANTIBODY: CPT

## 2023-03-22 PROCEDURE — 86900 BLOOD TYPING SEROLOGIC ABO: CPT

## 2023-03-22 PROCEDURE — 86703 HIV-1/HIV-2 1 RESULT ANTBDY: CPT

## 2023-03-22 PROCEDURE — 99214 OFFICE O/P EST MOD 30 MIN: CPT

## 2023-03-22 PROCEDURE — 86735 MUMPS ANTIBODY: CPT

## 2023-03-22 PROCEDURE — 86480 TB TEST CELL IMMUN MEASURE: CPT

## 2023-03-22 PROCEDURE — 86787 VARICELLA-ZOSTER ANTIBODY: CPT

## 2023-03-22 PROCEDURE — 86850 RBC ANTIBODY SCREEN: CPT

## 2023-03-22 NOTE — OB PROVIDER TRIAGE NOTE - NS_OBGYNHISTORY_OBGYN_ALL_OB_FT
OB Hx:   P1: PT VD 24 weeks, PPROM, NND  P2:  F 7-8  2x SAB no d/c  Gyn Hx: Denies h/o abnormal pap, STI, ovarian cysts, or fibroids OB Hx:   P1: PT  24 weeks, PPROM, PPH requiring transfusion, NND  P2:  F 7-8  2x SAB no d/c  Gyn Hx: Denies h/o abnormal pap, STI, ovarian cysts, or fibroids

## 2023-03-22 NOTE — OB RN TRIAGE NOTE - FALL HARM RISK - UNIVERSAL INTERVENTIONS
Bed in lowest position, wheels locked, appropriate side rails in place/Call bell, personal items and telephone in reach/Instruct patient to call for assistance before getting out of bed or chair/Non-slip footwear when patient is out of bed/[place] to call system/Physically safe environment - no spills, clutter or unnecessary equipment/Purposeful Proactive Rounding/Room/bathroom lighting operational, light cord in reach

## 2023-03-22 NOTE — OB PROVIDER TRIAGE NOTE - NSHPPHYSICALEXAM_GEN_ALL_CORE
Vital Signs Last 24 Hrs  T(C): 36.8 (22 Mar 2023 11:30), Max: 36.8 (22 Mar 2023 11:30)  T(F): 98.2 (22 Mar 2023 11:30), Max: 98.2 (22 Mar 2023 11:30)  HR: 63 (22 Mar 2023 11:30) (63 - 63)  BP: 100/64 (22 Mar 2023 11:30) (100/64 - 100/64)  RR: 18 (22 Mar 2023 11:30) (18 - 18) Vital Signs Last 24 Hrs  T(C): 36.8 (22 Mar 2023 11:30), Max: 36.8 (22 Mar 2023 11:30)  T(F): 98.2 (22 Mar 2023 11:30), Max: 98.2 (22 Mar 2023 11:30)  HR: 63 (22 Mar 2023 11:30) (63 - 63)  BP: 100/64 (22 Mar 2023 11:30) (100/64 - 100/64)  RR: 18 (22 Mar 2023 11:30) (18 - 18)    Physical Exam  Vital Signs Last 24 Hrs  T(F): 98.2 (22 Mar 2023 11:30), Max: 98.2 (22 Mar 2023 11:30)  HR: 63 (22 Mar 2023 11:30) (63 - 63)  BP: 100/64 (22 Mar 2023 11:30) (100/64 - 100/64)  RR: 18 (22 Mar 2023 11:30) (18 - 18)    Gen: NAD, AOx3  Abdomen: soft, gravid, nontender, no palpable contractions    EFM:  [place]:none  SVE:0/0/-3    BSS: TVUS CL 2.69cm  BPP 10/10 EFW transverse head to maternal left Vital Signs Last 24 Hrs  T(C): 36.8 (22 Mar 2023 11:30), Max: 36.8 (22 Mar 2023 11:30)  T(F): 98.2 (22 Mar 2023 11:30), Max: 98.2 (22 Mar 2023 11:30)  HR: 63 (22 Mar 2023 11:30) (63 - 63)  BP: 100/64 (22 Mar 2023 11:30) (100/64 - 100/64)  RR: 18 (22 Mar 2023 11:30) (18 - 18)    Physical Exam  Vital Signs Last 24 Hrs  T(F): 98.2 (22 Mar 2023 11:30), Max: 98.2 (22 Mar 2023 11:30)  HR: 63 (22 Mar 2023 11:30) (63 - 63)  BP: 100/64 (22 Mar 2023 11:30) (100/64 - 100/64)  RR: 18 (22 Mar 2023 11:30) (18 - 18)    Gen: NAD, AOx3  Abdomen: soft, gravid, nontender, no palpable contractions    EFM: 150bpm/moderate variability/+accels  [place]: none  SVE: 0/0/-3    BSS: TVUS CL 2.69cm  BPP 10/10 EFW transverse head to maternal left

## 2023-03-22 NOTE — OB PROVIDER TRIAGE NOTE - HISTORY OF PRESENT ILLNESS
21 yo  @ 25w6d by LMP (22). Presents to triage for care as she has not seen anyone this pregnancy. She endorses mild lower abdominal pain, worse with movement, usually when putting her pants on. She denies any vb, lof, cx, severe abdominal pain, dysuria, fever, chills, constipation or diarrhea. She states that she has a history of  delivery at 24 weeks, PPROM. She also endorses PPH after her , requiring blood transfusion. She denies any complications this pregnancy. Her last delivery was at [facility] but would like to establish care with us and deliver here.   21 yo  at 25w6d GA dated by LMP (22) presents to triage to establish prenatal care. Pt reports that she has not seen any provider for this pregnancy.   She endorses mild lower abdominal pain, worse with movement, usually when putting her pants on, rated 3/10 in intensity. She denies vaginal bleeding, leakage of fluid, contractions, severe abdominal pain, dysuria, fever, chills, constipation or diarrhea.     Pregnancy complicated by:   -h/o  delivery at 24 weeks, PPROM; with PPH requiring blood transfusion  -no prenatal care prior to presentation today        Sick Contacts: Denies   Recent Travel: Denies     Last bowel movement: this morning   Last sexual intercourse: week ago

## 2023-03-22 NOTE — OB PROVIDER TRIAGE NOTE - NSOBPROVIDERNOTE_OBGYN_ALL_OB_FT
23yo  @ 25w6d, establishing care, no prenatal care this pregnancy, h/o PPH post , reassuring maternal and fetal status    -PO hydration and Ambulation  -PTL precautions discussed  -F/u at [facility] for prenatal care    Dr. Best and Dr. Jansen aware A/P: 23yo  at 25w6d GA; presenting for establishment of prenatal care, with reassuring maternal and fetal status    -PO hydration and Ambulation  -PTL precautions discussed  -prenatal labs  -F/u at [facility] for prenatal care    Dr. Best and Dr. Jansen aware

## 2023-03-22 NOTE — OB RN TRIAGE NOTE - CHIEF COMPLAINT QUOTE
"want to see if everything is ok with the baby"  patient has not had prenatal care during this pregnancy

## 2023-03-23 LAB
HBV SURFACE AG SERPL QL IA: SIGNIFICANT CHANGE UP
MEV IGG SER-ACNC: 58.2 AU/ML — SIGNIFICANT CHANGE UP
MEV IGG+IGM SER-IMP: POSITIVE — SIGNIFICANT CHANGE UP
MUV AB SER-ACNC: POSITIVE — SIGNIFICANT CHANGE UP
MUV IGG FLD-ACNC: 15.9 AU/ML — SIGNIFICANT CHANGE UP
RUBV IGG SER-ACNC: 0.6 INDEX — SIGNIFICANT CHANGE UP
RUBV IGG SER-IMP: NEGATIVE — SIGNIFICANT CHANGE UP
VZV IGG FLD QL IA: 74.8 INDEX — SIGNIFICANT CHANGE UP
VZV IGG FLD QL IA: NEGATIVE — SIGNIFICANT CHANGE UP

## 2023-03-27 ENCOUNTER — OUTPATIENT (OUTPATIENT)
Dept: OUTPATIENT SERVICES | Facility: HOSPITAL | Age: 23
LOS: 1 days | End: 2023-03-27
Payer: SELF-PAY

## 2023-03-27 ENCOUNTER — RESULT CHARGE (OUTPATIENT)
Age: 23
End: 2023-03-27

## 2023-03-27 ENCOUNTER — APPOINTMENT (OUTPATIENT)
Dept: OBGYN | Facility: CLINIC | Age: 23
End: 2023-03-27
Payer: SELF-PAY

## 2023-03-27 ENCOUNTER — APPOINTMENT (OUTPATIENT)
Dept: OBGYN | Facility: CLINIC | Age: 23
End: 2023-03-27

## 2023-03-27 VITALS
HEART RATE: 77 BPM | HEIGHT: 64 IN | WEIGHT: 163 LBS | DIASTOLIC BLOOD PRESSURE: 70 MMHG | SYSTOLIC BLOOD PRESSURE: 109 MMHG | BODY MASS INDEX: 27.83 KG/M2

## 2023-03-27 DIAGNOSIS — Z34.90 ENCOUNTER FOR SUPERVISION OF NORMAL PREGNANCY, UNSPECIFIED, UNSPECIFIED TRIMESTER: ICD-10-CM

## 2023-03-27 LAB
BILIRUB UR QL STRIP: NEGATIVE
CLARITY UR: CLEAR
COLLECTION METHOD: NORMAL
GLUCOSE UR-MCNC: NEGATIVE
HCG UR QL: 0.2 EU/DL
HGB UR QL STRIP.AUTO: NEGATIVE
KETONES UR-MCNC: NEGATIVE
LEUKOCYTE ESTERASE UR QL STRIP: NEGATIVE
NITRITE UR QL STRIP: NEGATIVE
PH UR STRIP: 6
PROT UR STRIP-MCNC: NORMAL
SP GR UR STRIP: 1.03

## 2023-03-27 PROCEDURE — 87591 N.GONORRHOEAE DNA AMP PROB: CPT

## 2023-03-27 PROCEDURE — 81002 URINALYSIS NONAUTO W/O SCOPE: CPT

## 2023-03-27 PROCEDURE — 81243 FMR1 GEN ALY DETC ABNL ALLEL: CPT

## 2023-03-27 PROCEDURE — 99204 OFFICE O/P NEW MOD 45 MIN: CPT

## 2023-03-27 PROCEDURE — 86901 BLOOD TYPING SEROLOGIC RH(D): CPT

## 2023-03-27 PROCEDURE — 88142 CYTOPATH C/V THIN LAYER: CPT

## 2023-03-27 PROCEDURE — 87661 TRICHOMONAS VAGINALIS AMPLIF: CPT

## 2023-03-27 PROCEDURE — 81204 AR GENE CHARAC ALLELES: CPT

## 2023-03-27 PROCEDURE — 87491 CHLMYD TRACH DNA AMP PROBE: CPT

## 2023-03-28 DIAGNOSIS — Z34.90 ENCOUNTER FOR SUPERVISION OF NORMAL PREGNANCY, UNSPECIFIED, UNSPECIFIED TRIMESTER: ICD-10-CM

## 2023-03-28 LAB
C TRACH RRNA SPEC QL NAA+PROBE: NOT DETECTED
GAMMA INTERFERON BACKGROUND BLD IA-ACNC: 0.01 IU/ML — SIGNIFICANT CHANGE UP
M TB IFN-G BLD-IMP: NEGATIVE — SIGNIFICANT CHANGE UP
M TB IFN-G CD4+ BCKGRND COR BLD-ACNC: 0 IU/ML — SIGNIFICANT CHANGE UP
M TB IFN-G CD4+CD8+ BCKGRND COR BLD-ACNC: 0 IU/ML — SIGNIFICANT CHANGE UP
N GONORRHOEA RRNA SPEC QL NAA+PROBE: NOT DETECTED
QUANT TB PLUS MITOGEN MINUS NIL: 4.47 IU/ML — SIGNIFICANT CHANGE UP
SOURCE AMPLIFICATION: NORMAL
SOURCE AMPLIFICATION: NORMAL
T VAGINALIS RRNA SPEC QL NAA+PROBE: NOT DETECTED

## 2023-03-30 LAB — CYTOLOGY CVX/VAG DOC THIN PREP: NORMAL

## 2023-04-11 ENCOUNTER — ASOB RESULT (OUTPATIENT)
Age: 23
End: 2023-04-11

## 2023-04-11 ENCOUNTER — APPOINTMENT (OUTPATIENT)
Dept: ANTEPARTUM | Facility: CLINIC | Age: 23
End: 2023-04-11
Payer: SELF-PAY

## 2023-04-11 ENCOUNTER — OUTPATIENT (OUTPATIENT)
Dept: OUTPATIENT SERVICES | Facility: HOSPITAL | Age: 23
LOS: 1 days | End: 2023-04-11
Payer: SELF-PAY

## 2023-04-11 DIAGNOSIS — Z34.90 ENCOUNTER FOR SUPERVISION OF NORMAL PREGNANCY, UNSPECIFIED, UNSPECIFIED TRIMESTER: ICD-10-CM

## 2023-04-11 PROCEDURE — 76805 OB US >/= 14 WKS SNGL FETUS: CPT

## 2023-04-11 PROCEDURE — 76805 OB US >/= 14 WKS SNGL FETUS: CPT | Mod: 26

## 2023-04-17 DIAGNOSIS — O09.33 SUPERVISION OF PREGNANCY WITH INSUFFICIENT ANTENATAL CARE, THIRD TRIMESTER: ICD-10-CM

## 2023-04-17 DIAGNOSIS — Z36.3 ENCOUNTER FOR ANTENATAL SCREENING FOR MALFORMATIONS: ICD-10-CM

## 2023-04-17 DIAGNOSIS — Z3A.29 29 WEEKS GESTATION OF PREGNANCY: ICD-10-CM

## 2023-04-21 ENCOUNTER — NON-APPOINTMENT (OUTPATIENT)
Age: 23
End: 2023-04-21

## 2023-04-24 ENCOUNTER — RESULT CHARGE (OUTPATIENT)
Age: 23
End: 2023-04-24

## 2023-04-24 ENCOUNTER — APPOINTMENT (OUTPATIENT)
Dept: OBGYN | Facility: CLINIC | Age: 23
End: 2023-04-24
Payer: SELF-PAY

## 2023-04-24 ENCOUNTER — OUTPATIENT (OUTPATIENT)
Dept: OUTPATIENT SERVICES | Facility: HOSPITAL | Age: 23
LOS: 1 days | End: 2023-04-24
Payer: SELF-PAY

## 2023-04-24 VITALS
DIASTOLIC BLOOD PRESSURE: 60 MMHG | BODY MASS INDEX: 28.85 KG/M2 | SYSTOLIC BLOOD PRESSURE: 100 MMHG | WEIGHT: 169 LBS | HEIGHT: 64 IN

## 2023-04-24 DIAGNOSIS — Z34.90 ENCOUNTER FOR SUPERVISION OF NORMAL PREGNANCY, UNSPECIFIED, UNSPECIFIED TRIMESTER: ICD-10-CM

## 2023-04-24 LAB
BASOPHILS # BLD AUTO: 0.02 K/UL
BASOPHILS NFR BLD AUTO: 0.2 %
BILIRUB UR QL STRIP: NORMAL
CLARITY UR: CLEAR
COLLECTION METHOD: NORMAL
EOSINOPHIL # BLD AUTO: 0.04 K/UL
EOSINOPHIL NFR BLD AUTO: 0.4 %
ESTIMATED AVERAGE GLUCOSE: 94 MG/DL
GLUCOSE UR-MCNC: NORMAL
HBA1C MFR BLD HPLC: 4.9 %
HCG UR QL: 0.2 EU/DL
HCT VFR BLD CALC: 30.5 %
HGB BLD-MCNC: 10.2 G/DL
HGB UR QL STRIP.AUTO: NORMAL
IMM GRANULOCYTES NFR BLD AUTO: 0.6 %
KETONES UR-MCNC: NORMAL
LEUKOCYTE ESTERASE UR QL STRIP: NORMAL
LYMPHOCYTES # BLD AUTO: 1.64 K/UL
LYMPHOCYTES NFR BLD AUTO: 16.4 %
MAN DIFF?: NORMAL
MCHC RBC-ENTMCNC: 29.5 PG
MCHC RBC-ENTMCNC: 33.4 G/DL
MCV RBC AUTO: 88.2 FL
MONOCYTES # BLD AUTO: 0.72 K/UL
MONOCYTES NFR BLD AUTO: 7.2 %
NEUTROPHILS # BLD AUTO: 7.51 K/UL
NEUTROPHILS NFR BLD AUTO: 75.2 %
NITRITE UR QL STRIP: NORMAL
PH UR STRIP: 6
PLATELET # BLD AUTO: 397 K/UL
PROT UR STRIP-MCNC: 30
RBC # BLD: 3.46 M/UL
RBC # FLD: 13.5 %
SP GR UR STRIP: 1.01
WBC # FLD AUTO: 9.99 K/UL

## 2023-04-24 PROCEDURE — 83020 HEMOGLOBIN ELECTROPHORESIS: CPT | Mod: 26

## 2023-04-24 PROCEDURE — G0452: CPT | Mod: 26

## 2023-04-24 PROCEDURE — 99213 OFFICE O/P EST LOW 20 MIN: CPT

## 2023-04-24 PROCEDURE — 81002 URINALYSIS NONAUTO W/O SCOPE: CPT

## 2023-04-24 RX ADMIN — HUMAN RHO(D) IMMUNE GLOBULIN 0 UNIT: 300 INJECTION, SOLUTION INTRAMUSCULAR at 00:00

## 2023-04-25 LAB
ABO + RH PNL BLD: NORMAL
BLD GP AB SCN SERPL QL: NORMAL
GLUCOSE 1H P 50 G GLC PO SERPL-MCNC: 94 MG/DL
HGB A MFR BLD: 97.4 %
HGB A2 MFR BLD: 2.6 %
HGB FRACT BLD-IMP: NORMAL
HIV1+2 AB SPEC QL IA.RAPID: NONREACTIVE
LEAD BLD-MCNC: <1 UG/DL

## 2023-04-26 DIAGNOSIS — Z34.90 ENCOUNTER FOR SUPERVISION OF NORMAL PREGNANCY, UNSPECIFIED, UNSPECIFIED TRIMESTER: ICD-10-CM

## 2023-04-26 LAB
HBV SURFACE AG SER QL: NONREACTIVE
HCV AB SER QL: NONREACTIVE
HCV S/CO RATIO: 0.22 S/CO
MEV IGG FLD QL IA: 61.6 AU/ML
MEV IGG+IGM SER-IMP: POSITIVE
RUBV IGG FLD-ACNC: 0.8 INDEX
RUBV IGG SER-IMP: NEGATIVE
T PALLIDUM AB SER QL IA: NEGATIVE
VZV AB TITR SER: NEGATIVE
VZV IGG SER IF-ACNC: 52.9 INDEX

## 2023-04-28 LAB
AR GENE MUT ANL BLD/T: NORMAL
CHROMOSOME13 INTERPRETATION: NORMAL
CHROMOSOME13 TEST RESULT: NORMAL
CHROMOSOME18 INTERPRETATION: NORMAL
CHROMOSOME18 TEST RESULT: NORMAL
CHROMOSOME21 INTERPRETATION: NORMAL
CHROMOSOME21 TEST RESULT: NORMAL
FETAL FRACTION: NORMAL
FMR1 GENE MUT ANL BLD/T: NORMAL
PERFORMANCE AND LIMITATIONS: NORMAL
SEX CHROMOSOME INTERPRETATION: NORMAL
SEX CHROMOSOME TEST RESULT: NORMAL
VERIFI PRENATAL TEST: NOT DETECTED

## 2023-04-30 LAB — CFTR MUT TESTED BLD/T: NEGATIVE

## 2023-05-08 ENCOUNTER — OUTPATIENT (OUTPATIENT)
Dept: OUTPATIENT SERVICES | Facility: HOSPITAL | Age: 23
LOS: 1 days | End: 2023-05-08
Payer: MEDICAID

## 2023-05-08 ENCOUNTER — APPOINTMENT (OUTPATIENT)
Dept: OBGYN | Facility: CLINIC | Age: 23
End: 2023-05-08
Payer: SELF-PAY

## 2023-05-08 ENCOUNTER — NON-APPOINTMENT (OUTPATIENT)
Age: 23
End: 2023-05-08

## 2023-05-08 VITALS
WEIGHT: 169.06 LBS | BODY MASS INDEX: 28.86 KG/M2 | HEIGHT: 64 IN | SYSTOLIC BLOOD PRESSURE: 100 MMHG | DIASTOLIC BLOOD PRESSURE: 70 MMHG

## 2023-05-08 DIAGNOSIS — Z34.90 ENCOUNTER FOR SUPERVISION OF NORMAL PREGNANCY, UNSPECIFIED, UNSPECIFIED TRIMESTER: ICD-10-CM

## 2023-05-08 PROCEDURE — 99213 OFFICE O/P EST LOW 20 MIN: CPT

## 2023-05-08 PROCEDURE — 81002 URINALYSIS NONAUTO W/O SCOPE: CPT

## 2023-05-09 ENCOUNTER — ASOB RESULT (OUTPATIENT)
Age: 23
End: 2023-05-09

## 2023-05-09 ENCOUNTER — APPOINTMENT (OUTPATIENT)
Dept: ANTEPARTUM | Facility: CLINIC | Age: 23
End: 2023-05-09
Payer: MEDICAID

## 2023-05-09 ENCOUNTER — OUTPATIENT (OUTPATIENT)
Dept: OUTPATIENT SERVICES | Facility: HOSPITAL | Age: 23
LOS: 1 days | End: 2023-05-09
Payer: SELF-PAY

## 2023-05-09 DIAGNOSIS — Z34.90 ENCOUNTER FOR SUPERVISION OF NORMAL PREGNANCY, UNSPECIFIED, UNSPECIFIED TRIMESTER: ICD-10-CM

## 2023-05-09 PROCEDURE — 76816 OB US FOLLOW-UP PER FETUS: CPT

## 2023-05-09 PROCEDURE — 76818 FETAL BIOPHYS PROFILE W/NST: CPT | Mod: 26

## 2023-05-10 DIAGNOSIS — Z3A.33 33 WEEKS GESTATION OF PREGNANCY: ICD-10-CM

## 2023-05-10 DIAGNOSIS — Z36.4 ENCOUNTER FOR ANTENATAL SCREENING FOR FETAL GROWTH RETARDATION: ICD-10-CM

## 2023-05-10 DIAGNOSIS — O09.33 SUPERVISION OF PREGNANCY WITH INSUFFICIENT ANTENATAL CARE, THIRD TRIMESTER: ICD-10-CM

## 2023-05-11 DIAGNOSIS — Z34.90 ENCOUNTER FOR SUPERVISION OF NORMAL PREGNANCY, UNSPECIFIED, UNSPECIFIED TRIMESTER: ICD-10-CM

## 2023-05-22 ENCOUNTER — APPOINTMENT (OUTPATIENT)
Dept: OBGYN | Facility: CLINIC | Age: 23
End: 2023-05-22
Payer: SELF-PAY

## 2023-05-22 ENCOUNTER — NON-APPOINTMENT (OUTPATIENT)
Age: 23
End: 2023-05-22

## 2023-05-22 ENCOUNTER — RESULT CHARGE (OUTPATIENT)
Age: 23
End: 2023-05-22

## 2023-05-22 ENCOUNTER — OUTPATIENT (OUTPATIENT)
Dept: OUTPATIENT SERVICES | Facility: HOSPITAL | Age: 23
LOS: 1 days | End: 2023-05-22
Payer: SELF-PAY

## 2023-05-22 VITALS
SYSTOLIC BLOOD PRESSURE: 109 MMHG | HEART RATE: 85 BPM | WEIGHT: 171 LBS | DIASTOLIC BLOOD PRESSURE: 65 MMHG | BODY MASS INDEX: 29.19 KG/M2 | HEIGHT: 64 IN

## 2023-05-22 DIAGNOSIS — Z34.90 ENCOUNTER FOR SUPERVISION OF NORMAL PREGNANCY, UNSPECIFIED, UNSPECIFIED TRIMESTER: ICD-10-CM

## 2023-05-22 LAB
BILIRUB UR QL STRIP: NEGATIVE
CLARITY UR: CLEAR
COLLECTION METHOD: NORMAL
GLUCOSE UR-MCNC: NEGATIVE
HCG UR QL: 0.2 EU/DL
HGB UR QL STRIP.AUTO: NEGATIVE
KETONES UR-MCNC: NEGATIVE
LEUKOCYTE ESTERASE UR QL STRIP: NEGATIVE
NITRITE UR QL STRIP: NEGATIVE
PH UR STRIP: 6
PROT UR STRIP-MCNC: NEGATIVE
SP GR UR STRIP: 1.03

## 2023-05-22 PROCEDURE — 99213 OFFICE O/P EST LOW 20 MIN: CPT

## 2023-05-22 PROCEDURE — 81002 URINALYSIS NONAUTO W/O SCOPE: CPT

## 2023-05-23 DIAGNOSIS — Z34.90 ENCOUNTER FOR SUPERVISION OF NORMAL PREGNANCY, UNSPECIFIED, UNSPECIFIED TRIMESTER: ICD-10-CM

## 2023-06-05 ENCOUNTER — NON-APPOINTMENT (OUTPATIENT)
Age: 23
End: 2023-06-05

## 2023-06-05 ENCOUNTER — RESULT CHARGE (OUTPATIENT)
Age: 23
End: 2023-06-05

## 2023-06-05 ENCOUNTER — APPOINTMENT (OUTPATIENT)
Dept: OBGYN | Facility: CLINIC | Age: 23
End: 2023-06-05
Payer: MEDICAID

## 2023-06-05 ENCOUNTER — OUTPATIENT (OUTPATIENT)
Dept: OUTPATIENT SERVICES | Facility: HOSPITAL | Age: 23
LOS: 1 days | End: 2023-06-05
Payer: MEDICAID

## 2023-06-05 VITALS
HEART RATE: 71 BPM | SYSTOLIC BLOOD PRESSURE: 109 MMHG | WEIGHT: 168 LBS | HEIGHT: 64 IN | DIASTOLIC BLOOD PRESSURE: 72 MMHG | BODY MASS INDEX: 28.68 KG/M2

## 2023-06-05 DIAGNOSIS — Z34.90 ENCOUNTER FOR SUPERVISION OF NORMAL PREGNANCY, UNSPECIFIED, UNSPECIFIED TRIMESTER: ICD-10-CM

## 2023-06-05 LAB
BILIRUB UR QL STRIP: NORMAL
CLARITY UR: CLEAR
COLLECTION METHOD: NORMAL
GLUCOSE UR-MCNC: NORMAL
HCG UR QL: 0.2 EU/DL
HGB UR QL STRIP.AUTO: NORMAL
KETONES UR-MCNC: NORMAL
LEUKOCYTE ESTERASE UR QL STRIP: NORMAL
NITRITE UR QL STRIP: NORMAL
PH UR STRIP: 6
PROT UR STRIP-MCNC: NORMAL
SP GR UR STRIP: 1.02

## 2023-06-05 PROCEDURE — 99213 OFFICE O/P EST LOW 20 MIN: CPT

## 2023-06-05 PROCEDURE — 87653 STREP B DNA AMP PROBE: CPT

## 2023-06-05 PROCEDURE — 87491 CHLMYD TRACH DNA AMP PROBE: CPT

## 2023-06-05 PROCEDURE — 87591 N.GONORRHOEAE DNA AMP PROB: CPT

## 2023-06-05 PROCEDURE — 81002 URINALYSIS NONAUTO W/O SCOPE: CPT

## 2023-06-06 DIAGNOSIS — Z34.90 ENCOUNTER FOR SUPERVISION OF NORMAL PREGNANCY, UNSPECIFIED, UNSPECIFIED TRIMESTER: ICD-10-CM

## 2023-06-06 LAB
C TRACH RRNA SPEC QL NAA+PROBE: NOT DETECTED
N GONORRHOEA RRNA SPEC QL NAA+PROBE: NOT DETECTED
SOURCE AMPLIFICATION: NORMAL

## 2023-06-07 ENCOUNTER — NON-APPOINTMENT (OUTPATIENT)
Age: 23
End: 2023-06-07

## 2023-06-07 LAB
GP B STREP DNA SPEC QL NAA+PROBE: NOT DETECTED
SOURCE GBS: NORMAL

## 2023-06-08 ENCOUNTER — OUTPATIENT (OUTPATIENT)
Dept: INPATIENT UNIT | Facility: HOSPITAL | Age: 23
LOS: 1 days | Discharge: HOME | End: 2023-06-08
Payer: MEDICAID

## 2023-06-08 VITALS — DIASTOLIC BLOOD PRESSURE: 62 MMHG | HEART RATE: 102 BPM | SYSTOLIC BLOOD PRESSURE: 109 MMHG

## 2023-06-08 VITALS
TEMPERATURE: 100 F | SYSTOLIC BLOOD PRESSURE: 109 MMHG | RESPIRATION RATE: 18 BRPM | DIASTOLIC BLOOD PRESSURE: 62 MMHG | HEART RATE: 102 BPM

## 2023-06-08 DIAGNOSIS — O26.899 OTHER SPECIFIED PREGNANCY RELATED CONDITIONS, UNSPECIFIED TRIMESTER: ICD-10-CM

## 2023-06-08 DIAGNOSIS — O26.893 OTHER SPECIFIED PREGNANCY RELATED CONDITIONS, THIRD TRIMESTER: ICD-10-CM

## 2023-06-08 DIAGNOSIS — O47.1 FALSE LABOR AT OR AFTER 37 COMPLETED WEEKS OF GESTATION: ICD-10-CM

## 2023-06-08 DIAGNOSIS — Z3A.00 WEEKS OF GESTATION OF PREGNANCY NOT SPECIFIED: ICD-10-CM

## 2023-06-08 PROCEDURE — 99417 PROLNG OP E/M EACH 15 MIN: CPT | Mod: 25

## 2023-06-08 PROCEDURE — 99214 OFFICE O/P EST MOD 30 MIN: CPT

## 2023-06-08 PROCEDURE — 99215 OFFICE O/P EST HI 40 MIN: CPT | Mod: 25

## 2023-06-08 PROCEDURE — 76815 OB US LIMITED FETUS(S): CPT | Mod: 26

## 2023-06-08 PROCEDURE — 59025 FETAL NON-STRESS TEST: CPT | Mod: 26

## 2023-06-08 NOTE — OB PROVIDER TRIAGE NOTE - ATTENDING COMMENTS
Patient presented to triage-I met the patient and started my evaluation at 0355. The fetal heart rate monitor ended 0508. I spent 73 minutes caring for the patient. It included obtaining a history, performing of multiple examinations, continuously monitoring the fetus and interpretation of the fetal heart rate strip, ordering and reviewing labs-serial sets , documenting in the medical record and multiple discussions with the patient    21 y/o  @ 37 wks, in early labor, reassuring maternal and fetal status. Precautions discussed. Follow up at scheduled prenatal visit or PRN.

## 2023-06-08 NOTE — OB PROVIDER TRIAGE NOTE - LIVING CHILDREN, OB PROFILE
1
GI bleed with symptomatic hypotension/anemia in the setting of Eliquis use, likely upper GI in nature based on physical examination. Low MAPS with EMS, improved with NS administration, hgb 8.8 in the ED with previous Hgb of 14 in August '21. Reports Excedrin use of single pill daily, "usually non-aspirin formulation" per daughter. No alcohol use, no history of GI bleed. Post K-centra and one unit PRBCs.     - Protonix IVP with 80 mg and IV drip at 10 mg/hour.   - Clear liquid diet for tonight, NPO pending possible EGD/Colonoscopy on 4/25.   - Discontinue AC, Kcentra administration.   - Two large bore IVs and Active type and screens.

## 2023-06-08 NOTE — OB PROVIDER TRIAGE NOTE - NSOBPROVIDERNOTE_OBGYN_ALL_OB_FT
A/P: 21yo  at 37w0d GA, Rh neg (s/p rhogam 23), GBS neg, h/p PPH requiring pRBC after 24w PPROM, in latent labor    - PO hydration encouraged  - ambulation encouraged  - labor precautions given    Dr. Peres and Dr. Nasir gonzalez

## 2023-06-08 NOTE — OB PROVIDER TRIAGE NOTE - HISTORY OF PRESENT ILLNESS
23yo  at 37w0d GA (SHELLEY: 23 by LMP) presents to labor and delivery with irregular contractions for the past day. Denies vaginal bleeding, leakage of fluids. Endorses good fetal movement. GBS neg.    Pregnancy complicated by:  - Rh neg: s/p rhogam 23  - h/o  delivery at 24 weeks, PPROM, PPH requiring blood transfusion,  death  - rubella nonimmune   21yo  at 37w0d GA (SHELLEY: 23 by LMP) presents to labor and delivery with irregular contractions for the past day. Denies vaginal bleeding, leakage of fluids. Endorses good fetal movement. GBS neg.    Pregnancy complicated by:  - Rh neg: s/p rhogam 23  - h/o  delivery at 24 weeks, PPROM, PPH requiring blood transfusion,  death  - late presentation to prenatal care  - rubella nonimmune

## 2023-06-08 NOTE — OB PROVIDER TRIAGE NOTE - NS_OBGYNHISTORY_OBGYN_ALL_OB_FT
ObHx:   P1: 24w , PPROM, PPH requiring transfusion,  death  P2: FT , 7-8  h/o SABx2, no D+C    GynHx: denies fibroids, ovarian cysts, abnormal PAPs, STIs

## 2023-06-08 NOTE — OB PROVIDER TRIAGE NOTE - NSHPPHYSICALEXAM_GEN_ALL_CORE
Vital Signs Last 24 Hrs  T(C): 37.9 (08 Jun 2023 04:07), Max: 37.9 (08 Jun 2023 04:07)  T(F): 100.2 (08 Jun 2023 04:07), Max: 100.2 (08 Jun 2023 04:07)  HR: 102 (08 Jun 2023 04:08) (102 - 102)  BP: 109/62 (08 Jun 2023 04:08) (109/62 - 109/62)  RR: 18 (08 Jun 2023 04:07) (18 - 18)    Physical Exam  Gen: AAOx3, NAD  Abd: soft, gravid, nontender, no palpable contractions  Ext: no edema or erythema in bilateral upper and lower extremities  SVE: 3/50/-2, vertex, intact    EFM: 160/mod/+accels  [place]: q8min

## 2023-06-08 NOTE — OB PROVIDER TRIAGE NOTE - NSHPLABSRESULTS_GEN_ALL_CORE
6/5/23  GBS neg  CT/GC neg    4/24/23  rubella nonimmune  measles immune  varicella nonimmune  HepBSAg NR  TrepAb neg  O neg, neg antibody screen  HIV NR  GCT 94    SONOS  33w1d: EFW 1935gm (18%ile), vertex, posterios placenta  29w1d: limited anatomy scan but no gross abnormalities noted (limited views of spine, head, and heart)

## 2023-06-12 ENCOUNTER — APPOINTMENT (OUTPATIENT)
Dept: OBGYN | Facility: CLINIC | Age: 23
End: 2023-06-12

## 2023-06-13 ENCOUNTER — NON-APPOINTMENT (OUTPATIENT)
Age: 23
End: 2023-06-13

## 2023-06-15 ENCOUNTER — OUTPATIENT (OUTPATIENT)
Dept: OUTPATIENT SERVICES | Facility: HOSPITAL | Age: 23
LOS: 1 days | End: 2023-06-15
Payer: MEDICAID

## 2023-06-15 ENCOUNTER — RESULT CHARGE (OUTPATIENT)
Age: 23
End: 2023-06-15

## 2023-06-15 ENCOUNTER — APPOINTMENT (OUTPATIENT)
Dept: OBGYN | Facility: CLINIC | Age: 23
End: 2023-06-15
Payer: MEDICAID

## 2023-06-15 VITALS — SYSTOLIC BLOOD PRESSURE: 110 MMHG | DIASTOLIC BLOOD PRESSURE: 68 MMHG | WEIGHT: 170 LBS

## 2023-06-15 DIAGNOSIS — Z34.90 ENCOUNTER FOR SUPERVISION OF NORMAL PREGNANCY, UNSPECIFIED, UNSPECIFIED TRIMESTER: ICD-10-CM

## 2023-06-15 LAB
BILIRUB UR QL STRIP: NORMAL
CLARITY UR: CLEAR
COLLECTION METHOD: NORMAL
GLUCOSE UR-MCNC: NORMAL
HCG UR QL: 0.2 EU/DL
HGB UR QL STRIP.AUTO: NORMAL
KETONES UR-MCNC: NORMAL
LEUKOCYTE ESTERASE UR QL STRIP: NORMAL
NITRITE UR QL STRIP: NORMAL
PH UR STRIP: 6.5
PROT UR STRIP-MCNC: NORMAL
SP GR UR STRIP: 1.01

## 2023-06-15 PROCEDURE — 99213 OFFICE O/P EST LOW 20 MIN: CPT

## 2023-06-19 ENCOUNTER — NON-APPOINTMENT (OUTPATIENT)
Age: 23
End: 2023-06-19

## 2023-06-19 ENCOUNTER — INPATIENT (INPATIENT)
Facility: HOSPITAL | Age: 23
LOS: 0 days | Discharge: HOME | DRG: 560 | End: 2023-06-20
Attending: OBSTETRICS & GYNECOLOGY | Admitting: OBSTETRICS & GYNECOLOGY
Payer: MEDICAID

## 2023-06-19 VITALS
WEIGHT: 169.98 LBS | DIASTOLIC BLOOD PRESSURE: 63 MMHG | HEIGHT: 64 IN | RESPIRATION RATE: 16 BRPM | TEMPERATURE: 98 F | HEART RATE: 60 BPM | SYSTOLIC BLOOD PRESSURE: 98 MMHG

## 2023-06-19 DIAGNOSIS — O26.899 OTHER SPECIFIED PREGNANCY RELATED CONDITIONS, UNSPECIFIED TRIMESTER: ICD-10-CM

## 2023-06-19 DIAGNOSIS — Z98.890 OTHER SPECIFIED POSTPROCEDURAL STATES: Chronic | ICD-10-CM

## 2023-06-19 DIAGNOSIS — O42.92 FULL-TERM PREMATURE RUPTURE OF MEMBRANES, UNSPECIFIED AS TO LENGTH OF TIME BETWEEN RUPTURE AND ONSET OF LABOR: ICD-10-CM

## 2023-06-19 DIAGNOSIS — Z3A.00 WEEKS OF GESTATION OF PREGNANCY NOT SPECIFIED: ICD-10-CM

## 2023-06-19 LAB
ALLERGY+IMMUNOLOGY DIAG STUDY NOTE: SIGNIFICANT CHANGE UP
BASOPHILS # BLD AUTO: 0.01 K/UL — SIGNIFICANT CHANGE UP (ref 0–0.2)
BASOPHILS NFR BLD AUTO: 0.1 % — SIGNIFICANT CHANGE UP (ref 0–1)
BLD GP AB SCN SERPL QL: SIGNIFICANT CHANGE UP
DIR ANTIGLOB POLYSPECIFIC INTERPRETATION: SIGNIFICANT CHANGE UP
EOSINOPHIL # BLD AUTO: 0.05 K/UL — SIGNIFICANT CHANGE UP (ref 0–0.7)
EOSINOPHIL NFR BLD AUTO: 0.4 % — SIGNIFICANT CHANGE UP (ref 0–8)
HCT VFR BLD CALC: 31.3 % — LOW (ref 37–47)
HGB BLD-MCNC: 10.6 G/DL — LOW (ref 12–16)
IMM GRANULOCYTES NFR BLD AUTO: 0.4 % — HIGH (ref 0.1–0.3)
LYMPHOCYTES # BLD AUTO: 18.5 % — LOW (ref 20.5–51.1)
LYMPHOCYTES # BLD AUTO: 2.15 K/UL — SIGNIFICANT CHANGE UP (ref 1.2–3.4)
MCHC RBC-ENTMCNC: 28.5 PG — SIGNIFICANT CHANGE UP (ref 27–31)
MCHC RBC-ENTMCNC: 33.9 G/DL — SIGNIFICANT CHANGE UP (ref 32–37)
MCV RBC AUTO: 84.1 FL — SIGNIFICANT CHANGE UP (ref 81–99)
MONOCYTES # BLD AUTO: 0.73 K/UL — HIGH (ref 0.1–0.6)
MONOCYTES NFR BLD AUTO: 6.3 % — SIGNIFICANT CHANGE UP (ref 1.7–9.3)
NEUTROPHILS # BLD AUTO: 8.62 K/UL — HIGH (ref 1.4–6.5)
NEUTROPHILS NFR BLD AUTO: 74.3 % — SIGNIFICANT CHANGE UP (ref 42.2–75.2)
NRBC # BLD: 0 /100 WBCS — SIGNIFICANT CHANGE UP (ref 0–0)
PLATELET # BLD AUTO: 434 K/UL — HIGH (ref 130–400)
PMV BLD: 9.5 FL — SIGNIFICANT CHANGE UP (ref 7.4–10.4)
PRENATAL SYPHILIS TEST: SIGNIFICANT CHANGE UP
RBC # BLD: 3.72 M/UL — LOW (ref 4.2–5.4)
RBC # FLD: 13.7 % — SIGNIFICANT CHANGE UP (ref 11.5–14.5)
WBC # BLD: 11.61 K/UL — HIGH (ref 4.8–10.8)
WBC # FLD AUTO: 11.61 K/UL — HIGH (ref 4.8–10.8)

## 2023-06-19 PROCEDURE — 86880 COOMBS TEST DIRECT: CPT

## 2023-06-19 PROCEDURE — 86850 RBC ANTIBODY SCREEN: CPT

## 2023-06-19 PROCEDURE — 86870 RBC ANTIBODY IDENTIFICATION: CPT

## 2023-06-19 PROCEDURE — 86922 COMPATIBILITY TEST ANTIGLOB: CPT

## 2023-06-19 PROCEDURE — 59409 OBSTETRICAL CARE: CPT | Mod: U9

## 2023-06-19 PROCEDURE — 86780 TREPONEMA PALLIDUM: CPT

## 2023-06-19 PROCEDURE — 85461 HEMOGLOBIN FETAL: CPT

## 2023-06-19 PROCEDURE — 59050 FETAL MONITOR W/REPORT: CPT

## 2023-06-19 PROCEDURE — 36415 COLL VENOUS BLD VENIPUNCTURE: CPT

## 2023-06-19 PROCEDURE — 86900 BLOOD TYPING SEROLOGIC ABO: CPT

## 2023-06-19 PROCEDURE — 86901 BLOOD TYPING SEROLOGIC RH(D): CPT

## 2023-06-19 PROCEDURE — 90384 RH IG FULL-DOSE IM: CPT

## 2023-06-19 PROCEDURE — 86592 SYPHILIS TEST NON-TREP QUAL: CPT

## 2023-06-19 PROCEDURE — 85025 COMPLETE CBC W/AUTO DIFF WBC: CPT

## 2023-06-19 PROCEDURE — 86077 PHYS BLOOD BANK SERV XMATCH: CPT

## 2023-06-19 PROCEDURE — 86769 SARS-COV-2 COVID-19 ANTIBODY: CPT

## 2023-06-19 RX ORDER — BENZOCAINE 10 %
1 GEL (GRAM) MUCOUS MEMBRANE EVERY 6 HOURS
Refills: 0 | Status: DISCONTINUED | OUTPATIENT
Start: 2023-06-19 | End: 2023-06-20

## 2023-06-19 RX ORDER — LANOLIN
1 OINTMENT (GRAM) TOPICAL EVERY 6 HOURS
Refills: 0 | Status: DISCONTINUED | OUTPATIENT
Start: 2023-06-19 | End: 2023-06-20

## 2023-06-19 RX ORDER — SODIUM CHLORIDE 9 MG/ML
3 INJECTION INTRAMUSCULAR; INTRAVENOUS; SUBCUTANEOUS EVERY 8 HOURS
Refills: 0 | Status: DISCONTINUED | OUTPATIENT
Start: 2023-06-19 | End: 2023-06-20

## 2023-06-19 RX ORDER — IBUPROFEN 200 MG
600 TABLET ORAL EVERY 6 HOURS
Refills: 0 | Status: DISCONTINUED | OUTPATIENT
Start: 2023-06-19 | End: 2023-06-20

## 2023-06-19 RX ORDER — OXYCODONE HYDROCHLORIDE 5 MG/1
5 TABLET ORAL ONCE
Refills: 0 | Status: DISCONTINUED | OUTPATIENT
Start: 2023-06-19 | End: 2023-06-20

## 2023-06-19 RX ORDER — DIPHENHYDRAMINE HCL 50 MG
25 CAPSULE ORAL EVERY 6 HOURS
Refills: 0 | Status: DISCONTINUED | OUTPATIENT
Start: 2023-06-19 | End: 2023-06-20

## 2023-06-19 RX ORDER — KETOROLAC TROMETHAMINE 30 MG/ML
30 SYRINGE (ML) INJECTION ONCE
Refills: 0 | Status: DISCONTINUED | OUTPATIENT
Start: 2023-06-19 | End: 2023-06-19

## 2023-06-19 RX ORDER — OXYTOCIN 10 UNIT/ML
41.67 VIAL (ML) INJECTION
Qty: 20 | Refills: 0 | Status: DISCONTINUED | OUTPATIENT
Start: 2023-06-19 | End: 2023-06-20

## 2023-06-19 RX ORDER — SODIUM CHLORIDE 9 MG/ML
1000 INJECTION, SOLUTION INTRAVENOUS
Refills: 0 | Status: DISCONTINUED | OUTPATIENT
Start: 2023-06-19 | End: 2023-06-19

## 2023-06-19 RX ORDER — TETANUS TOXOID, REDUCED DIPHTHERIA TOXOID AND ACELLULAR PERTUSSIS VACCINE, ADSORBED 5; 2.5; 8; 8; 2.5 [IU]/.5ML; [IU]/.5ML; UG/.5ML; UG/.5ML; UG/.5ML
0.5 SUSPENSION INTRAMUSCULAR ONCE
Refills: 0 | Status: DISCONTINUED | OUTPATIENT
Start: 2023-06-19 | End: 2023-06-20

## 2023-06-19 RX ORDER — OXYCODONE HYDROCHLORIDE 5 MG/1
5 TABLET ORAL
Refills: 0 | Status: DISCONTINUED | OUTPATIENT
Start: 2023-06-19 | End: 2023-06-20

## 2023-06-19 RX ORDER — AER TRAVELER 0.5 G/1
1 SOLUTION RECTAL; TOPICAL EVERY 4 HOURS
Refills: 0 | Status: DISCONTINUED | OUTPATIENT
Start: 2023-06-19 | End: 2023-06-20

## 2023-06-19 RX ORDER — ACETAMINOPHEN 500 MG
975 TABLET ORAL
Refills: 0 | Status: DISCONTINUED | OUTPATIENT
Start: 2023-06-19 | End: 2023-06-20

## 2023-06-19 RX ORDER — DIBUCAINE 1 %
1 OINTMENT (GRAM) RECTAL EVERY 6 HOURS
Refills: 0 | Status: DISCONTINUED | OUTPATIENT
Start: 2023-06-19 | End: 2023-06-20

## 2023-06-19 RX ORDER — OXYTOCIN 10 UNIT/ML
333.33 VIAL (ML) INJECTION
Qty: 20 | Refills: 0 | Status: DISCONTINUED | OUTPATIENT
Start: 2023-06-19 | End: 2023-06-19

## 2023-06-19 RX ORDER — PRAMOXINE HYDROCHLORIDE 150 MG/15G
1 AEROSOL, FOAM RECTAL EVERY 4 HOURS
Refills: 0 | Status: DISCONTINUED | OUTPATIENT
Start: 2023-06-19 | End: 2023-06-20

## 2023-06-19 RX ORDER — MAGNESIUM HYDROXIDE 400 MG/1
30 TABLET, CHEWABLE ORAL
Refills: 0 | Status: DISCONTINUED | OUTPATIENT
Start: 2023-06-19 | End: 2023-06-20

## 2023-06-19 RX ORDER — HYDROCORTISONE 1 %
1 OINTMENT (GRAM) TOPICAL EVERY 6 HOURS
Refills: 0 | Status: DISCONTINUED | OUTPATIENT
Start: 2023-06-19 | End: 2023-06-20

## 2023-06-19 RX ORDER — IBUPROFEN 200 MG
600 TABLET ORAL EVERY 6 HOURS
Refills: 0 | Status: COMPLETED | OUTPATIENT
Start: 2023-06-19 | End: 2024-05-17

## 2023-06-19 RX ORDER — SIMETHICONE 80 MG/1
80 TABLET, CHEWABLE ORAL EVERY 4 HOURS
Refills: 0 | Status: DISCONTINUED | OUTPATIENT
Start: 2023-06-19 | End: 2023-06-20

## 2023-06-19 RX ADMIN — Medication 125 MILLIUNIT(S)/MIN: at 09:10

## 2023-06-19 RX ADMIN — Medication 975 MILLIGRAM(S): at 22:13

## 2023-06-19 RX ADMIN — Medication 30 MILLIGRAM(S): at 09:10

## 2023-06-19 RX ADMIN — SODIUM CHLORIDE 3 MILLILITER(S): 9 INJECTION INTRAMUSCULAR; INTRAVENOUS; SUBCUTANEOUS at 18:48

## 2023-06-19 RX ADMIN — Medication 975 MILLIGRAM(S): at 21:10

## 2023-06-19 RX ADMIN — SODIUM CHLORIDE 3 MILLILITER(S): 9 INJECTION INTRAMUSCULAR; INTRAVENOUS; SUBCUTANEOUS at 21:12

## 2023-06-19 NOTE — OB PROVIDER DELIVERY SUMMARY - NSSELHIDDEN_OBGYN_ALL_OB_FT
[NS_DeliveryAttending1_OBGYN_ALL_OB_FT:[id number]],[NS_DeliveryAssist1_OBGYN_ALL_OB_FT:[id number]=]

## 2023-06-19 NOTE — OB PROVIDER DELIVERY SUMMARY - NSPROVIDERDELIVERYNOTE_OBGYN_ALL_OB_FT
Patient was fully dilated and pushing. Fetal head was OA and restituted to ROT. The anterior and posterior shoulders delivered, followed by the remaining body atraumatically. The  was handed to the mother and RN. Delayed cord clamping was performed, and then clamped and cut. Cord blood gases collected x2. The placenta delivered intact with membranes. Pitocin was administered. Uterus massaged, fundus found to be firm. Cervix, vagina and perineum inspected periurethral laceration was noted, repaired using 3-0 chromic in the usual fashion with good hemostasis.     Viable male infant delivered with APGARs 9/9    Laceration: periurethral  EBL 300cc    Dr. Sharpe present for the delivery Patient was fully dilated and pushing. Fetal head was OA and restituted to ROT. The anterior and posterior shoulders delivered, followed by the remaining body atraumatically. The  was handed to the mother and RN. Delayed cord clamping was performed, and then clamped and cut. Cord blood gases collected x2. The placenta delivered intact with membranes. Pitocin was administered. Uterus massaged, fundus found to be firm. Cervix, vagina and perineum inspected periurethral laceration was noted, repaired using 3-0 chromic in the usual fashion with good hemostasis.     Viable male infant delivered with APGARs 9/9    Laceration: periurethral  EBL 300cc    Dr. Sharpe present for the delivery    Attending note.   Pt underwent uncomplicated . Intact perineum. Small periurethral laceration repaired without incident. Viable male infant delivered with apgars 9/9

## 2023-06-19 NOTE — OB RN PATIENT PROFILE - FUNCTIONAL ASSESSMENT - DAILY ACTIVITY 3.
4 = No assist / stand by assistance
**ATTENDING ADDENDUM (Dr. Je Ricci): patient admits history of "colitis" (unfounded in EMR)

## 2023-06-19 NOTE — OB RN DELIVERY SUMMARY - NS_NEWBORNACONDIT_OBGYN_ALL_OB
Chronic, stable  -advanced in recent months as patient having frequent falls in 3/2019 per chart review, now has been non-ambulatory  -Interferon - continue Chronic, stable  Advanced in recent months as patient having frequent falls in 3/2019 per chart review, non-ambulatory, uses motor scooter to ambulate (chronic)  -Continue home medication Interferon Liveborn

## 2023-06-19 NOTE — OB PROVIDER H&P - HISTORY OF PRESENT ILLNESS
22 y.o.  at 38w4d, Rh-/GBS-, presents with strong contractions. Denies LOF and VB.     Complications with this pregnancy:  --Rh-negative. RhoGam on 23  --Rubella NON-immune  --late registrant at 26w4d  --hx PPROM and IUFD at 24 weeks, PPH with blood transfusion

## 2023-06-19 NOTE — OB PROVIDER H&P - NS_OBGYNHISTORY_OBGYN_ALL_OB_FT
OB hx  2020: 40-wk , female, 7 lb 8 oz (3402g)  2017: 24-wk PPROM, IUFD, , PPH with blood transfusion  ETOP x2 with D&C    GYN: denies cysts, fibroids, abnormal Paps

## 2023-06-19 NOTE — OB PROVIDER H&P - NSHPPHYSICALEXAM_GEN_ALL_CORE
HR: 58 (06-19-23 @ 09:01) (58 - 58)  BP: 114/64 (06-19-23 @ 09:01) (114/64 - 114/64)    (exam by Dr. Vasquez, PGY-2)  General: alert, oriented, reporting contraction pains  Abd: gravid, nontender  Extremities: no sign of DVT on exam  EFM: 135 bpm, moderate variability, +accels (Cat 1)  TOCO: q 3 min  SVE: 8/90/0

## 2023-06-19 NOTE — OB PROVIDER H&P - OB VTE ASSESSMENT
Plastic Surgeon Procedure Text (A): After obtaining clear surgical margins the patient was sent to plastics for surgical repair.  The patient understands they will receive post-surgical care and follow-up from the referring physician's office. <<--- Click to launch

## 2023-06-19 NOTE — OB RN DELIVERY SUMMARY - NSSELHIDDEN_OBGYN_ALL_OB_FT
[NS_DeliveryAttending1_OBGYN_ALL_OB_FT:[id number]],[NS_DeliveryAssist1_OBGYN_ALL_OB_FT:[id number]=]

## 2023-06-19 NOTE — OB PROVIDER H&P - NSLOWPPHRISK_OBGYN_A_OB
No previous uterine incision/[place] Pregnancy/Less than or equal to 4 previous vaginal births/No known bleeding disorder

## 2023-06-19 NOTE — OB PROVIDER DELIVERY SUMMARY - NSLOWPPHRISK_OBGYN_A_OB
No previous uterine incision/[place] Pregnancy/Less than or equal to 4 previous vaginal births/No known bleeding disorder/No other PPH risks indicated

## 2023-06-19 NOTE — OB PROVIDER H&P - NSHPLABSRESULTS_GEN_ALL_CORE
LABS  (6/5/23)  GBS-negative  GC/CT neg/neg  (4/24/23)  CBC 9.99>10.2/30.5<397  HbA1c 4.9%  Lead <1  Hb electrophoresis nl AA  Rubella NON-immune  Measles immune  Varicella NON-immune  HBsAg nonreactive  HCVab nonreactive  Syphilis screen negative  CF/Frag X/SMA negative x3  Blood type O negative  Antibody screen negative  HIV nonreactive  NIPS low risk  GCT 94  (3/27/23)  Pap NILM  GC/CT/trich negative x3    US  5/9/23 @ 33w1d: SIUP, vertex, placenta posterior fundal, EFW 18%  4/11/23 @ 29w1d: SIUP, breech, placenta posterior fundal, EFW 20%, visualized anatomy WNL

## 2023-06-19 NOTE — OB PROVIDER H&P - NSHPREVIEWOFSYSTEMS_GEN_ALL_CORE
Constitutional: DENIES fever, chills, malaise  Respiratory: DENIES cough, SOB  GI: DENIES nausea, vomiting, diarrhea  /GYN: + contractions

## 2023-06-19 NOTE — OB PROVIDER H&P - ASSESSMENT
22 y.o.  at 38w4d, Rh-/GBS-, in active labor.    Admit to L&D  Admission labs  IV hydration  Continuous EFM & TOCO monitoring  Anticipate   Offer MMR vaccine postpartum due to Rubella NON-immune    Dr. ESTEFANY Sharpe and Dr. Vasquez PGY-2 aware

## 2023-06-19 NOTE — OB RN TRIAGE NOTE - FALL HARM RISK - UNIVERSAL INTERVENTIONS
Bed in lowest position, wheels locked, appropriate side rails in place/Call bell, personal items and telephone in reach/Instruct patient to call for assistance before getting out of bed or chair/Non-slip footwear when patient is out of bed/[place] to call system/Physically safe environment - no spills, clutter or unnecessary equipment/Purposeful Proactive Rounding/Room/bathroom lighting operational, light cord in reach

## 2023-06-20 ENCOUNTER — TRANSCRIPTION ENCOUNTER (OUTPATIENT)
Age: 23
End: 2023-06-20

## 2023-06-20 VITALS
DIASTOLIC BLOOD PRESSURE: 53 MMHG | TEMPERATURE: 98 F | SYSTOLIC BLOOD PRESSURE: 96 MMHG | RESPIRATION RATE: 18 BRPM | HEART RATE: 58 BPM

## 2023-06-20 LAB
BASOPHILS # BLD AUTO: 0.02 K/UL — SIGNIFICANT CHANGE UP (ref 0–0.2)
BASOPHILS # BLD AUTO: 0.02 K/UL — SIGNIFICANT CHANGE UP (ref 0–0.2)
BASOPHILS NFR BLD AUTO: 0.2 % — SIGNIFICANT CHANGE UP (ref 0–1)
BASOPHILS NFR BLD AUTO: 0.2 % — SIGNIFICANT CHANGE UP (ref 0–1)
COVID-19 SPIKE DOMAIN AB INTERP: POSITIVE
COVID-19 SPIKE DOMAIN ANTIBODY RESULT: >250 U/ML — HIGH
EOSINOPHIL # BLD AUTO: 0.02 K/UL — SIGNIFICANT CHANGE UP (ref 0–0.7)
EOSINOPHIL # BLD AUTO: 0.04 K/UL — SIGNIFICANT CHANGE UP (ref 0–0.7)
EOSINOPHIL NFR BLD AUTO: 0.2 % — SIGNIFICANT CHANGE UP (ref 0–8)
EOSINOPHIL NFR BLD AUTO: 0.5 % — SIGNIFICANT CHANGE UP (ref 0–8)
FETAL SCREEN: SIGNIFICANT CHANGE UP
HCT VFR BLD CALC: 24.9 % — LOW (ref 37–47)
HCT VFR BLD CALC: 25.9 % — LOW (ref 37–47)
HGB BLD-MCNC: 8.2 G/DL — LOW (ref 12–16)
HGB BLD-MCNC: 8.6 G/DL — LOW (ref 12–16)
IMM GRANULOCYTES NFR BLD AUTO: 0.5 % — HIGH (ref 0.1–0.3)
IMM GRANULOCYTES NFR BLD AUTO: 0.5 % — HIGH (ref 0.1–0.3)
LYMPHOCYTES # BLD AUTO: 2.02 K/UL — SIGNIFICANT CHANGE UP (ref 1.2–3.4)
LYMPHOCYTES # BLD AUTO: 2.07 K/UL — SIGNIFICANT CHANGE UP (ref 1.2–3.4)
LYMPHOCYTES # BLD AUTO: 24 % — SIGNIFICANT CHANGE UP (ref 20.5–51.1)
LYMPHOCYTES # BLD AUTO: 25.2 % — SIGNIFICANT CHANGE UP (ref 20.5–51.1)
MCHC RBC-ENTMCNC: 28.3 PG — SIGNIFICANT CHANGE UP (ref 27–31)
MCHC RBC-ENTMCNC: 28.4 PG — SIGNIFICANT CHANGE UP (ref 27–31)
MCHC RBC-ENTMCNC: 32.9 G/DL — SIGNIFICANT CHANGE UP (ref 32–37)
MCHC RBC-ENTMCNC: 33.2 G/DL — SIGNIFICANT CHANGE UP (ref 32–37)
MCV RBC AUTO: 85.5 FL — SIGNIFICANT CHANGE UP (ref 81–99)
MCV RBC AUTO: 85.9 FL — SIGNIFICANT CHANGE UP (ref 81–99)
MONOCYTES # BLD AUTO: 0.47 K/UL — SIGNIFICANT CHANGE UP (ref 0.1–0.6)
MONOCYTES # BLD AUTO: 0.51 K/UL — SIGNIFICANT CHANGE UP (ref 0.1–0.6)
MONOCYTES NFR BLD AUTO: 5.9 % — SIGNIFICANT CHANGE UP (ref 1.7–9.3)
MONOCYTES NFR BLD AUTO: 5.9 % — SIGNIFICANT CHANGE UP (ref 1.7–9.3)
NEUTROPHILS # BLD AUTO: 5.45 K/UL — SIGNIFICANT CHANGE UP (ref 1.4–6.5)
NEUTROPHILS # BLD AUTO: 5.93 K/UL — SIGNIFICANT CHANGE UP (ref 1.4–6.5)
NEUTROPHILS NFR BLD AUTO: 68 % — SIGNIFICANT CHANGE UP (ref 42.2–75.2)
NEUTROPHILS NFR BLD AUTO: 68.9 % — SIGNIFICANT CHANGE UP (ref 42.2–75.2)
NRBC # BLD: 0 /100 WBCS — SIGNIFICANT CHANGE UP (ref 0–0)
NRBC # BLD: 0 /100 WBCS — SIGNIFICANT CHANGE UP (ref 0–0)
PLATELET # BLD AUTO: 352 K/UL — SIGNIFICANT CHANGE UP (ref 130–400)
PLATELET # BLD AUTO: 382 K/UL — SIGNIFICANT CHANGE UP (ref 130–400)
PMV BLD: 10 FL — SIGNIFICANT CHANGE UP (ref 7.4–10.4)
PMV BLD: 10.2 FL — SIGNIFICANT CHANGE UP (ref 7.4–10.4)
RBC # BLD: 2.9 M/UL — LOW (ref 4.2–5.4)
RBC # BLD: 3.03 M/UL — LOW (ref 4.2–5.4)
RBC # FLD: 13.6 % — SIGNIFICANT CHANGE UP (ref 11.5–14.5)
RBC # FLD: 13.8 % — SIGNIFICANT CHANGE UP (ref 11.5–14.5)
SARS-COV-2 IGG+IGM SERPL QL IA: >250 U/ML — HIGH
SARS-COV-2 IGG+IGM SERPL QL IA: POSITIVE
T PALLIDUM AB TITR SER: NEGATIVE — SIGNIFICANT CHANGE UP
WBC # BLD: 8.02 K/UL — SIGNIFICANT CHANGE UP (ref 4.8–10.8)
WBC # BLD: 8.61 K/UL — SIGNIFICANT CHANGE UP (ref 4.8–10.8)
WBC # FLD AUTO: 8.02 K/UL — SIGNIFICANT CHANGE UP (ref 4.8–10.8)
WBC # FLD AUTO: 8.61 K/UL — SIGNIFICANT CHANGE UP (ref 4.8–10.8)

## 2023-06-20 PROCEDURE — 99238 HOSP IP/OBS DSCHRG MGMT 30/<: CPT

## 2023-06-20 RX ORDER — MEDROXYPROGESTERONE ACETATE 150 MG/ML
150 INJECTION, SUSPENSION, EXTENDED RELEASE INTRAMUSCULAR ONCE
Refills: 0 | Status: COMPLETED | OUTPATIENT
Start: 2023-06-20 | End: 2023-06-20

## 2023-06-20 RX ORDER — ACETAMINOPHEN 500 MG
2 TABLET ORAL
Qty: 0 | Refills: 0 | DISCHARGE
Start: 2023-06-20

## 2023-06-20 RX ORDER — IBUPROFEN 200 MG
1 TABLET ORAL
Qty: 0 | Refills: 0 | DISCHARGE
Start: 2023-06-20

## 2023-06-20 RX ADMIN — MEDROXYPROGESTERONE ACETATE 150 MILLIGRAM(S): 150 INJECTION, SUSPENSION, EXTENDED RELEASE INTRAMUSCULAR at 10:57

## 2023-06-20 RX ADMIN — SODIUM CHLORIDE 3 MILLILITER(S): 9 INJECTION INTRAMUSCULAR; INTRAVENOUS; SUBCUTANEOUS at 13:53

## 2023-06-20 RX ADMIN — Medication 600 MILLIGRAM(S): at 00:01

## 2023-06-20 RX ADMIN — Medication 600 MILLIGRAM(S): at 01:06

## 2023-06-20 NOTE — PROGRESS NOTE ADULT - SUBJECTIVE AND OBJECTIVE BOX
PGY-1 Note    Chief Complaint: Postpartum    HPI: Pt doing well, pain well controlled. No overnight events, no acute complaints, denies fever, chills, SOB, CP, abdominal pain, or heavy vaginal bleeding.    ROS: Denies cardiovascular or respiratory symptoms    PAST MEDICAL & SURGICAL HISTORY:  Anemia      History of postpartum hemorrhage      History of D&C          Physical Exam  Vital Signs Last 24 Hrs  T(F): 98.3 (19 Jun 2023 23:28), Max: 99 (19 Jun 2023 15:26)  HR: 53 (19 Jun 2023 23:28) (53 - 99)  BP: 95/52 (19 Jun 2023 23:28) (88/59 - 114/64)  RR: 18 (19 Jun 2023 23:28) (16 - 18)    Physical exam:  General - AAOx3, NAD  Heart - S1S2, RRR  Lungs - CTA BL  Abdomen:  - Soft, nontender, nondistended, BS+  - Fundus firm, nontender, below the umbilicus  Pelvis/Vagina - Normal Lochia  Extremities - No calf tenderness, no swelling    Labs:                        10.6   11.61 )-----------( 434      ( 19 Jun 2023 08:20 )             31.3     ABO RH Interpretation: O NEG (06-19-23 @ 08:20)  Antibody Screen: POS (06-19-23 @ 08:20)    Prenatal Syphilis Test: Nonreact (06-19-23 @ 08:20)

## 2023-06-20 NOTE — PROGRESS NOTE ADULT - ATTENDING COMMENTS
PPD1 s/p . Pt doing well. No ob issues. For circ today. For possible d/c home today with 6 week post partum visit

## 2023-06-20 NOTE — DISCHARGE NOTE OB - CARE PROVIDER_API CALL
Roberto Santiago  Obstetrics and Gynecology  [address] [place] [address]  Phone: [phone]  Fax: [phone]  Follow Up Time:

## 2023-06-20 NOTE — PROGRESS NOTE ADULT - ASSESSMENT
21yo P3  S/P  PPD1, RH-, recovering well.    - encourage ambulation  - PO hydration  - Continue diet as tolerated   - Monitor vitals and bleeding  - f/u AM CBC   - anticipate d/c home today and is instructed to follow up in 6 weeks.   - desires circ, consent in baby's chart  -Depo for contraception, will order, nurse to administer before discharge    Dr. Nguyen and Dr. Santiago to be made aware

## 2023-06-20 NOTE — DISCHARGE NOTE OB - CARE PLAN
Assessment and plan of treatment:	Please follow up in 6 weeks   1 Principal Discharge DX:	Vaginal delivery  Assessment and plan of treatment:	Please follow up in 6 weeks

## 2023-06-20 NOTE — DISCHARGE NOTE OB - PATIENT PORTAL LINK FT
You can access the FollowMyHealth Patient Portal offered by [facility] by registering at the following website: [email or web address]. By joining [facility]’s FollowMyHealth portal, you will also be able to view your health information using other applications (apps) compatible with our system.

## 2023-06-20 NOTE — DISCHARGE NOTE OB - NS MD DC FALL RISK RISK
For information on Fall & Injury Prevention, visit: [email or web address] OR  [email or web address] OR  https://www.cdc.gov/steadi/patient.html

## 2023-06-20 NOTE — DISCHARGE NOTE OB - MEDICATION SUMMARY - MEDICATIONS TO TAKE
I will START or STAY ON the medications listed below when I get home from the hospital:    ibuprofen 600 mg oral tablet  -- 1 tab(s) by mouth every 6 hours  -- Indication: For For pain as needed    acetaminophen 325 mg oral tablet  -- 2 tab(s) by mouth every 6 hours  -- Indication: For For pain as needed

## 2023-06-22 ENCOUNTER — APPOINTMENT (OUTPATIENT)
Dept: OBGYN | Facility: CLINIC | Age: 23
End: 2023-06-22

## 2023-06-23 ENCOUNTER — EMERGENCY (EMERGENCY)
Facility: HOSPITAL | Age: 23
LOS: 0 days | Discharge: HOME | End: 2023-06-23
Attending: STUDENT IN AN ORGANIZED HEALTH CARE EDUCATION/TRAINING PROGRAM
Payer: MEDICAID

## 2023-06-23 VITALS
HEART RATE: 60 BPM | DIASTOLIC BLOOD PRESSURE: 55 MMHG | TEMPERATURE: 98 F | SYSTOLIC BLOOD PRESSURE: 90 MMHG | RESPIRATION RATE: 18 BRPM | OXYGEN SATURATION: 100 %

## 2023-06-23 VITALS
SYSTOLIC BLOOD PRESSURE: 142 MMHG | HEART RATE: 78 BPM | HEIGHT: 64 IN | OXYGEN SATURATION: 99 % | TEMPERATURE: 98 F | RESPIRATION RATE: 18 BRPM | WEIGHT: 153 LBS | DIASTOLIC BLOOD PRESSURE: 76 MMHG

## 2023-06-23 DIAGNOSIS — Z86.2 PERSONAL HISTORY OF DISEASES OF THE BLOOD AND BLOOD-FORMING ORGANS AND CERTAIN DISORDERS INVOLVING THE IMMUNE MECHANISM: ICD-10-CM

## 2023-06-23 DIAGNOSIS — N81.4 UTEROVAGINAL PROLAPSE, UNSPECIFIED: ICD-10-CM

## 2023-06-23 DIAGNOSIS — Z98.890 OTHER SPECIFIED POSTPROCEDURAL STATES: Chronic | ICD-10-CM

## 2023-06-23 PROCEDURE — 99284 EMERGENCY DEPT VISIT MOD MDM: CPT

## 2023-06-23 NOTE — ED PROVIDER NOTE - OBJECTIVE STATEMENT
22-year-old female  A2 with vaginal delivery few days ago and no past medical history who presents with uterine prolapse.  Reports that she noticed discomfort around her vaginal area prior to arrival and noticed a tissue coming out of her vagina.  Endorses regular amount of vaginal bleeding since vaginal delivery.  Denies fever, shortness of breath, chest pain, nausea, vomiting, abdominal pain, any urinary symptoms.

## 2023-06-23 NOTE — ED ADULT NURSE NOTE - NSFALLHARMRISKINTERV_ED_ALL_ED

## 2023-06-23 NOTE — CONSULT NOTE ADULT - SUBJECTIVE AND OBJECTIVE BOX
HPI: 23 yo  s/p  with periurethral repair on 23 presented to the ED for evaluation. Patient reports she felt a weird sensation in her vaginal when she was getting in the car this afternoon. Denies vaginal pain, abdominal pain, vaginal discharge. Denies urinary complaints. Reports postpartum bleeding has been minimal, requiring 3-4 pads daily. Denies fevers, chills, nausea, vomiting. BP on arrival 142/76, BPs during hospital stay were all normotensive. Patient reports vitals were taken when she was crying and anxious. Repeat BP 90/55. Denies history of elevated BPs prior to pregnancy or during pregnancy. Denies headache, chest pain, shortness of breath, RUQ pain, LE swelling/pain.    Ob/Gyn History:             2020: 40-wk   2017: 24-wk PPROM, IUFD, , PPH with blood transfusion  2023: 38 week   ETOP x2 with D&C   Denies history of ovarian cysts, uterine fibroids, abnormal paps, or STIs    Denies the following: constitutional symptoms, visual symptoms, cardiovascular symptoms, respiratory symptoms, GI symptoms, musculoskeletal symptoms, skin symptoms, neurologic symptoms, hematologic symptoms, allergic symptoms, psychiatric symptoms  Except any pertinent positives listed.     Home Medications:  acetaminophen 325 mg oral tablet: 2 tab(s) orally every 6 hours (2023 06:33)  ibuprofen 600 mg oral tablet: 1 tab(s) orally every 6 hours (2023 06:33)    No Known Allergies  Intolerances    PAST MEDICAL & SURGICAL HISTORY:  Anemia  History of postpartum hemorrhage  History of D&C    FAMILY HISTORY:  No pertinent family history in first degree relatives    SOCIAL HISTORY: Denies cigarette use, alcohol use, or illicit drug use    Vital Signs Last 24 Hrs  T(F): 98.2 (2023 19:20), Max: 98.2 (2023 19:20)  HR: 60 (2023 19:20) (60 - 78)  BP: 90/55 (2023 19:20) (90/55 - 142/76)  RR: 18 (2023 19:20) (18 - 18)    General Appearance - AAOx3, NAD  Heart - S1S2 regular rate and rhythm  Lung - CTA Bilaterally  Abdomen - Soft, nontender, nondistended, no rebound, no rigidity    GYN/Pelvis:    Labia Majora - Normal  Labia Minora - Normal  Clitoris - Normal  Urethra - Normal  Vagina - 10cc of dark red blood  Cervix - Cervical prolapse to introitus with bearing down. Cervix manually reducible     Uterus:  Size - Normal  Tenderness - None  Mass - None  Freely mobile    Adnexa:  Masses - None  Tenderness - None

## 2023-06-23 NOTE — CONSULT NOTE ADULT - ASSESSMENT
23 yo  s/p  on  with cervical prolapse, currently clinically and hemodynamically stable  -no acute GYN intervention  -patient reassured cervical prolapse is normal with multiparity  -patient instructed to manually reduce when she has discomfort  -patient to follow at [facility] for postpartum visit  -repeat vitals wnl, low suspicion for hypertensive disorder  -dispo per ED    Discussed with Dr Higuera

## 2023-06-23 NOTE — ED PROVIDER NOTE - CLINICAL SUMMARY MEDICAL DECISION MAKING FREE TEXT BOX
22-year-old female presented today with symptomatology consistent with cervical prolapse.  OB/GYN evaluated patient at bedside.  Patient did not have any acute interventions at this time.  Patient will be followed up outpatient with OB/GYN.  Patient has no pain at this time and is discharged.  Return precautions explained to patient.

## 2023-06-23 NOTE — ED PROVIDER NOTE - PHYSICAL EXAMINATION
CONSTITUTIONAL: in no apparent distress.   ENT: Hearing is intact with good acuity to spoken voice.  Patient is speaking clearly, not muffled and airway is intact.   RESPIRATORY: No signs of respiratory distress. Lung sounds are clear in all lobes bilaterally without rales, rhonchi, or wheezes.  CARDIOVASCULAR: Regular rate and rhythm.   GI: Abdomen is soft, non-tender, and without distention. Bowel sounds are present and normoactive in all four quadrants. No masses are noted.   PELVIC: Chaperone: PRABHJOT Cooper. External genitalia without erythema, lesion, or mass. No vaginal discharge. No vaginal bleeding. Uterine prolapse noticed only upon bearing down.  BACK: No evidence of trauma or deformity. No CVA tenderness bilaterally. Normal ROM.   NEURO: A & O x 3. Normal speech. No focal deficit.  PSYCHOLOGICAL: Appropriate mood and affect. Good judgement and insight.

## 2023-06-23 NOTE — ED ADULT NURSE NOTE - NS_SISCREENINGSR_GEN_ALL_ED
Radiology Post-Procedure Note    Pre Op Diagnosis: Stage IV CKD, nephrotic syndrome and hyperkalemia  Post Op Diagnosis: Same    Procedure: Fluoroscopic-guided placement of a 15.0-cm RIJV-approach NTHDC    Procedure performed by: Eran Ravi MD    Written Informed Consent Obtained: Yes  Specimen Removed: NO  Estimated Blood Loss: Minimal    Findings:   Successful fluoroscopic-guided placement of a 15.0-cm RIJV-approach NTHDC with local anesthetic only. Patient tolerated the procedure well. No immediate post-procedural complications noted.     Pt to be transferred directly to dialysis unit for HD post.     Bed rest with HOB elevated at least 30 degrees for 2 hours post IJ line placement.     Tentative plan to convert the NTHDC to a THDC under moderate conscious sedation via current venous access @ 08:30 on 8/21/20 assuming improvement of hyperkalemia with HD today.     Thank you for considering IR for the care of your patient.     Eran Ravi MD  Interventional Radiology         Negative

## 2023-06-23 NOTE — ED PROVIDER NOTE - NSFOLLOWUPINSTRUCTIONS_ED_ALL_ED_FT
Please make sure to follow-up with your GYN with the appointment that you have already established.    Please make sure to follow up with your primary care doctor in 3 days.      Pelvic Organ Prolapse    Pelvic organ prolapse is a condition in women that involves the stretching, bulging, or dropping of pelvic organs into an abnormal position, past the opening of the vagina. It happens when the muscles and tissues that surround and support pelvic structures become weak or stretched. Pelvic organ prolapse can involve the:  Vagina (vaginal prolapse).  Uterus (uterine prolapse).  Bladder (cystocele).  Rectum (rectocele).  Intestines (enterocele).  When organs other than the vagina are involved, they often bulge into the vagina or protrude from the vagina, depending on how severe the prolapse is.    What are the causes?  This condition may be caused by:  Pregnancy, labor, and childbirth.  Past pelvic surgery.  Lower levels of the hormone estrogen due to menopause.  Consistently lifting more than 50 lb (23 kg).  Obesity.  Long-term difficulty passing stool (chronic constipation).  Long-term, or chronic, cough.  Fluid buildup in the abdomen due to certain conditions.  What are the signs or symptoms?  Symptoms of this condition include:  Leaking a little urine (loss of bladder control) when you cough, sneeze, strain, and exercise (stress incontinence). This may be worse immediately after childbirth. It may gradually improve over time.  Feeling pressure in your pelvis or vagina. This pressure may increase when you cough or when you are passing stool.  A bulge that protrudes from the opening of your vagina.  Difficulty passing urine or stool.  Pain in your lower back.  Pain or discomfort during sex, or decreased interest in sex.  Repeated bladder infections (urinary tract infections).  Difficulty inserting a tampon.  In some people, this condition causes no symptoms.    How is this diagnosed?  This condition may be diagnosed based on a vaginal and rectal exam. During the exam, you may be asked to cough and strain while you are lying down, sitting, and standing up. Your health care provider will determine if other tests are required, such as bladder function tests.    How is this treated?  Treatment for this condition may depend on your symptoms. Treatment may include:  Lifestyle changes, such as drinking plenty of fluids and eating foods that are high in fiber.  Emptying your bladder at scheduled times (bladder training therapy). This can help reduce or avoid urinary incontinence.  Estrogen. This may help mild prolapse by increasing the strength and tone of pelvic floor muscles.  Kegel exercises. These may help mild cases of prolapse by strengthening and tightening the muscles of the pelvic floor.  A soft, flexible device that helps support the vaginal walls and keep pelvic organs in place (pessary). This is inserted into your vagina by your health care provider.  Surgery. This is often the only form of treatment for severe prolapse.  Follow these instructions at home:  Eating and drinking    Avoid drinking beverages that contain caffeine or alcohol.  Increase your intake of high-fiber foods to decrease constipation and straining during bowel movements.  Activity    Lose weight if recommended by your health care provider.  Avoid heavy lifting and straining with exercise and work. Do not hold your breath when you perform mild to moderate lifting and exercise activities. Limit your activities as directed by your health care provider.  Do Kegel exercises as directed by your health care provider. To do this:  Squeeze your pelvic floor muscles tight. You should feel a tight lift in your rectal area and a tightness in your vaginal area. Keep your stomach, buttocks, and legs relaxed.  Hold the muscles tight for up to 10 seconds. Then relax your muscles.  Repeat this exercise 50 times a day, or as much as told by your health care provider. Continue to do this exercise for at least 4–6 weeks, or for as long as told by your health care provider.  General instructions    Take over-the-counter and prescription medicines only as told by your health care provider.  Wear a sanitary pad or adult diapers if you have urinary incontinence.  If you have a pessary, take care of it as told by your health care provider.  Keep all follow-up visits. This is important.  Contact a health care provider if you:  Have symptoms that interfere with your daily activities or sex life.  Need medicine to help with the discomfort.  Notice bleeding from your vagina that is not related to your menstrual period.  Have a fever.  Have pain or bleeding when you urinate.  Have bleeding when you pass stool.  Pass urine when you have sex.  Have chronic constipation.  Have a pessary that falls out.  Have a foul-smelling vaginal discharge.  Have an unusual, low pain in your abdomen.  Get help right away if you:  Cannot pass urine.  Summary  Pelvic organ prolapse is the stretching, bulging, or dropping of pelvic organs into an abnormal position. It happens when the muscles and tissues that surround and support pelvic structures become weak or stretched.  When organs other than the vagina are involved, they often bulge into the vagina or protrude from it, depending on how severe the prolapse is.  In most cases, this condition needs to be treated only if it produces symptoms. Treatment may include lifestyle changes, estrogen, Kegel exercises, pessary insertion, or surgery.  Avoid heavy lifting and straining with exercise and work. Do not hold your breath when you perform mild to moderate lifting and exercise activities. Limit your activities as directed by your health care provider.  This information is not intended to replace advice given to you by your health care provider. Make sure you discuss any questions you have with your health care provider.

## 2023-06-23 NOTE — ED ADULT TRIAGE NOTE - TEMPERATURE IN CELSIUS (DEGREES C)
EXAMINATION TYPE: XR Hip Complete RT

 

DATE OF EXAM: 12/17/2020

 

CLINICAL HISTORY: Right hip pain.

 

TECHNIQUE:  AP and frogleg views of the right hip are obtained.

 

COMPARISON: None.

 

FINDINGS:  There is no acute fracture/dislocation evident in the right hip. Moderate axial joint spac
e loss with mild to moderate acetabular spurring. A few scattered right-sided pelvic phleboliths.

 

IMPRESSION: As above .
EXAMINATION TYPE: XR lumbosacral spine min 4V

 

DATE OF EXAM: 12/17/2020

 

COMPARISON: None

 

HISTORY: Pain

 

TECHNIQUE: Five-view lumbar spine

 

FINDINGS: There 5 lumbar-type vertebral bodies. Pedicles are intact. Facet degenerative changes are p
resent. Hypertrophy is noted L4-5 and L5-S1. No spondylolytic defects are evident mild diffuse disc s
pace narrowing is present, greatest at L4-5. Vertebral body heights are preserved.

 

IMPRESSION:

1.  Degenerative facet changes and some disc space narrowing discussed above.
36.7

## 2023-06-23 NOTE — ED ADULT TRIAGE NOTE - CHIEF COMPLAINT QUOTE
Patient states she vaginally delivered on 6/19, c/o heavy vaginal bleeding and possible prolapsed uterus

## 2023-06-23 NOTE — ED PROVIDER NOTE - PATIENT PORTAL LINK FT
You can access the FollowMyHealth Patient Portal offered by [facility] by registering at the following website: [email or web address]. By joining [employer]’s FollowMyHealth portal, you will also be able to view your health information using other applications (apps) compatible with our system.

## 2023-06-24 DIAGNOSIS — Z3A.38 38 WEEKS GESTATION OF PREGNANCY: ICD-10-CM

## 2023-06-24 PROBLEM — Z87.59 PERSONAL HISTORY OF OTHER COMPLICATIONS OF PREGNANCY, CHILDBIRTH AND THE PUERPERIUM: Chronic | Status: ACTIVE | Noted: 2023-06-19

## 2023-06-29 NOTE — ED ADULT NURSE NOTE - HOW OFTEN DO YOU HAVE A DRINK CONTAINING ALCOHOL?
"  Interventional Radiology Note  Inpatient - [facility]  6/29/2023    O:  /50 (BP Location: Right arm)   Pulse 80   Temp 99.5  F (37.5  C) (Oral)   Resp 20   Ht 1.605 m (5' 3.19\")   Wt 75.4 kg (166 lb 3.6 oz)   LMP  (LMP Unknown)   SpO2 94%   BMI 29.27 kg/m        A:   82 year old female with a past medical history significant for Zenker diverticulum, esophageal stricture with dilatation, chronic dysphagia S/P G tube placement 6/20. Now with nausea/vomiting, GI was requesting conversion from G to GJ    P:    Spoke with Dr. Dupont of IR who placed the G tube. He reported that pt had a very tight esophageal stricture that should naturally protect from reflux/vomiting. He would like to trial just having the tube feeds running with pt NPO to see if that resolves the issue. Discussed with hospitalist MD England 6/28    Pt has NPO with tube feeds and meds through the G tube and thus far no nausea or vomiting per discussion x2 with bedside RN  -Will cancel plans for GJ tube for now and just continue pt as NPO with meds/TFs via G tube  -IR will sign off. Please call if Karl Shanks PA-C  Interventional Radiology  [facility] IR PA desk phone *[phone]  .  " Never

## 2023-09-22 NOTE — DISCHARGE NOTE OB - HISTORY OF COVID-19 VACCINATION
Vaccine status unknown Otezla Pregnancy And Lactation Text: This medication is Pregnancy Category C and it isn't known if it is safe during pregnancy. It is unknown if it is excreted in breast milk.

## 2023-12-15 ENCOUNTER — NON-APPOINTMENT (OUTPATIENT)
Age: 23
End: 2023-12-15

## 2024-02-07 NOTE — OB PROVIDER TRIAGE NOTE - NS_DISPOSITION_OBGYN_ALL_OB
[de-identified] : WIL EDWARDS , is  a 4-year-old male here for initial consultation for constipation for the past several years.    mostly picky eater   stools are every 2-3 days.  they are type II and are very large and hard.  No clogging the toilet no blood with wiping.  He has used intermittent MiraLAX in the past. using Hiya vitamins to help with extra fiber and nutrients. eats chicken nuggets and pizza.  he cheeks the food in his mouth and then will spit it out.  He denies having dysphagia or difficulty swallowing.  He denies having early satiety during the visit today. eats carrots. eats strawberries and blueberries, bananas.  does like avocado and cucumbers.  mom needs to spoon feed him proteins when she drops it off and put it in avocado.  Doesn't eat red meat.   will eat 1/2 a pizza, chicken nuggets and French fries. will eat mac and cheese   Does have intermittent nonbilious non-bloody emesis at times.  Denies abdominal pain or bloating.  no heartburn, chest pain or dysphagia.   Denies diarrhea, easy bleeding or bruising, jaundice, hematochezia, melena, recurrent fevers or infection, mouth sores, joint swelling, vision changes, unintentional weight loss.   Denies choking, dysphagia, cyanosis with feeds.      Discharge

## 2024-09-20 ENCOUNTER — OUTPATIENT (OUTPATIENT)
Dept: OUTPATIENT SERVICES | Facility: HOSPITAL | Age: 24
LOS: 1 days | End: 2024-09-20
Payer: MEDICAID

## 2024-09-20 ENCOUNTER — OUTPATIENT (OUTPATIENT)
Dept: OUTPATIENT SERVICES | Facility: HOSPITAL | Age: 24
LOS: 1 days | End: 2024-09-20

## 2024-09-20 DIAGNOSIS — Z01.20 ENCOUNTER FOR DENTAL EXAMINATION AND CLEANING WITHOUT ABNORMAL FINDINGS: ICD-10-CM

## 2024-09-20 DIAGNOSIS — Z98.890 OTHER SPECIFIED POSTPROCEDURAL STATES: Chronic | ICD-10-CM

## 2024-09-20 PROCEDURE — D0120: CPT

## 2024-09-20 PROCEDURE — D0330: CPT

## 2024-09-20 PROCEDURE — D0274: CPT

## 2024-09-20 PROCEDURE — D1110: CPT

## 2024-09-20 PROCEDURE — D0230: CPT

## 2024-09-26 DIAGNOSIS — Z01.21 ENCOUNTER FOR DENTAL EXAMINATION AND CLEANING WITH ABNORMAL FINDINGS: ICD-10-CM

## 2025-03-26 ENCOUNTER — OUTPATIENT (OUTPATIENT)
Dept: OUTPATIENT SERVICES | Facility: HOSPITAL | Age: 25
LOS: 1 days | End: 2025-03-26
Payer: COMMERCIAL

## 2025-03-26 DIAGNOSIS — Z98.890 OTHER SPECIFIED POSTPROCEDURAL STATES: Chronic | ICD-10-CM

## 2025-03-26 DIAGNOSIS — Z01.20 ENCOUNTER FOR DENTAL EXAMINATION AND CLEANING WITHOUT ABNORMAL FINDINGS: ICD-10-CM

## 2025-03-26 PROCEDURE — D0230: CPT

## 2025-03-26 PROCEDURE — D1110: CPT

## 2025-03-26 PROCEDURE — D7250: CPT

## 2025-03-26 PROCEDURE — D0274: CPT

## 2025-03-26 PROCEDURE — D0120: CPT

## 2025-03-28 DIAGNOSIS — Z01.21 ENCOUNTER FOR DENTAL EXAMINATION AND CLEANING WITH ABNORMAL FINDINGS: ICD-10-CM

## 2025-05-02 ENCOUNTER — OUTPATIENT (OUTPATIENT)
Dept: OUTPATIENT SERVICES | Facility: HOSPITAL | Age: 25
LOS: 1 days | End: 2025-05-02
Payer: COMMERCIAL

## 2025-05-02 DIAGNOSIS — Z98.890 OTHER SPECIFIED POSTPROCEDURAL STATES: Chronic | ICD-10-CM

## 2025-05-02 DIAGNOSIS — K02.52 DENTAL CARIES ON PIT AND FISSURE SURFACE PENETRATING INTO DENTIN: ICD-10-CM

## 2025-05-02 PROCEDURE — D1354: CPT

## 2025-05-05 DIAGNOSIS — K02.9 DENTAL CARIES, UNSPECIFIED: ICD-10-CM

## 2025-05-09 ENCOUNTER — OUTPATIENT (OUTPATIENT)
Dept: OUTPATIENT SERVICES | Facility: HOSPITAL | Age: 25
LOS: 1 days | End: 2025-05-09
Payer: COMMERCIAL

## 2025-05-09 DIAGNOSIS — Z98.890 OTHER SPECIFIED POSTPROCEDURAL STATES: Chronic | ICD-10-CM

## 2025-05-09 DIAGNOSIS — K02.9 DENTAL CARIES, UNSPECIFIED: ICD-10-CM

## 2025-05-09 PROCEDURE — D2150: CPT

## 2025-05-12 DIAGNOSIS — K02.52 DENTAL CARIES ON PIT AND FISSURE SURFACE PENETRATING INTO DENTIN: ICD-10-CM

## 2025-05-21 ENCOUNTER — OUTPATIENT (OUTPATIENT)
Dept: OUTPATIENT SERVICES | Facility: HOSPITAL | Age: 25
LOS: 1 days | End: 2025-05-21
Payer: COMMERCIAL

## 2025-05-21 DIAGNOSIS — Z98.890 OTHER SPECIFIED POSTPROCEDURAL STATES: Chronic | ICD-10-CM

## 2025-05-21 DIAGNOSIS — K01.1 IMPACTED TEETH: ICD-10-CM

## 2025-05-21 PROCEDURE — D7140: CPT

## 2025-05-23 ENCOUNTER — OUTPATIENT (OUTPATIENT)
Dept: OUTPATIENT SERVICES | Facility: HOSPITAL | Age: 25
LOS: 1 days | End: 2025-05-23

## 2025-05-23 DIAGNOSIS — Z98.890 OTHER SPECIFIED POSTPROCEDURAL STATES: Chronic | ICD-10-CM

## 2025-05-23 DIAGNOSIS — K02.9 DENTAL CARIES, UNSPECIFIED: ICD-10-CM

## 2025-05-23 PROCEDURE — D2140: CPT

## 2025-05-28 DIAGNOSIS — K02.9 DENTAL CARIES, UNSPECIFIED: ICD-10-CM

## 2025-05-30 ENCOUNTER — OUTPATIENT (OUTPATIENT)
Dept: OUTPATIENT SERVICES | Facility: HOSPITAL | Age: 25
LOS: 1 days | End: 2025-05-30
Payer: COMMERCIAL

## 2025-05-30 DIAGNOSIS — Z98.890 OTHER SPECIFIED POSTPROCEDURAL STATES: Chronic | ICD-10-CM

## 2025-05-30 DIAGNOSIS — K02.52 DENTAL CARIES ON PIT AND FISSURE SURFACE PENETRATING INTO DENTIN: ICD-10-CM

## 2025-05-30 PROCEDURE — D2150: CPT

## 2025-06-02 DIAGNOSIS — K02.9 DENTAL CARIES, UNSPECIFIED: ICD-10-CM

## 2025-06-05 DIAGNOSIS — K01.0 EMBEDDED TEETH: ICD-10-CM

## 2025-06-06 ENCOUNTER — OUTPATIENT (OUTPATIENT)
Dept: OUTPATIENT SERVICES | Facility: HOSPITAL | Age: 25
LOS: 1 days | End: 2025-06-06
Payer: MEDICAID

## 2025-06-06 DIAGNOSIS — K02.52 DENTAL CARIES ON PIT AND FISSURE SURFACE PENETRATING INTO DENTIN: ICD-10-CM

## 2025-06-06 DIAGNOSIS — Z98.890 OTHER SPECIFIED POSTPROCEDURAL STATES: Chronic | ICD-10-CM

## 2025-06-06 PROCEDURE — D2160: CPT

## 2025-06-09 DIAGNOSIS — K02.9 DENTAL CARIES, UNSPECIFIED: ICD-10-CM

## 2025-08-04 ENCOUNTER — OUTPATIENT (OUTPATIENT)
Dept: OUTPATIENT SERVICES | Facility: HOSPITAL | Age: 25
LOS: 1 days | End: 2025-08-04
Payer: COMMERCIAL

## 2025-08-04 DIAGNOSIS — Z98.890 OTHER SPECIFIED POSTPROCEDURAL STATES: Chronic | ICD-10-CM

## 2025-08-04 DIAGNOSIS — Z01.20 ENCOUNTER FOR DENTAL EXAMINATION AND CLEANING WITHOUT ABNORMAL FINDINGS: ICD-10-CM

## 2025-08-04 PROCEDURE — D0120: CPT

## 2025-08-04 PROCEDURE — D0230: CPT

## 2025-08-04 PROCEDURE — D0274: CPT

## 2025-08-07 DIAGNOSIS — Z01.21 ENCOUNTER FOR DENTAL EXAMINATION AND CLEANING WITH ABNORMAL FINDINGS: ICD-10-CM

## 2025-08-08 ENCOUNTER — OUTPATIENT (OUTPATIENT)
Dept: OUTPATIENT SERVICES | Facility: HOSPITAL | Age: 25
LOS: 1 days | End: 2025-08-08

## 2025-08-08 DIAGNOSIS — Z98.890 OTHER SPECIFIED POSTPROCEDURAL STATES: Chronic | ICD-10-CM

## 2025-08-08 DIAGNOSIS — Z01.20 ENCOUNTER FOR DENTAL EXAMINATION AND CLEANING WITHOUT ABNORMAL FINDINGS: ICD-10-CM

## 2025-08-08 PROCEDURE — D1110: CPT

## 2025-08-15 ENCOUNTER — OUTPATIENT (OUTPATIENT)
Dept: OUTPATIENT SERVICES | Facility: HOSPITAL | Age: 25
LOS: 1 days | End: 2025-08-15

## 2025-08-15 DIAGNOSIS — K02.9 DENTAL CARIES, UNSPECIFIED: ICD-10-CM

## 2025-08-15 DIAGNOSIS — Z98.890 OTHER SPECIFIED POSTPROCEDURAL STATES: Chronic | ICD-10-CM

## 2025-08-15 DIAGNOSIS — Z01.21 ENCOUNTER FOR DENTAL EXAMINATION AND CLEANING WITH ABNORMAL FINDINGS: ICD-10-CM

## 2025-08-15 PROCEDURE — D2392: CPT

## 2025-08-20 DIAGNOSIS — K02.9 DENTAL CARIES, UNSPECIFIED: ICD-10-CM
